# Patient Record
Sex: FEMALE | Race: WHITE | Employment: FULL TIME | URBAN - METROPOLITAN AREA
[De-identification: names, ages, dates, MRNs, and addresses within clinical notes are randomized per-mention and may not be internally consistent; named-entity substitution may affect disease eponyms.]

---

## 2018-01-24 ENCOUNTER — HOSPITAL ENCOUNTER (OUTPATIENT)
Dept: PHYSICAL THERAPY | Age: 38
Discharge: HOME | End: 2018-01-24
Payer: COMMERCIAL

## 2018-01-24 PROCEDURE — 97162 PT EVAL MOD COMPLEX 30 MIN: CPT

## 2018-01-24 PROCEDURE — 97535 SELF CARE MNGMENT TRAINING: CPT

## 2018-01-24 PROCEDURE — 97140 MANUAL THERAPY 1/> REGIONS: CPT

## 2018-01-24 NOTE — PROGRESS NOTES
[address] PHYSICAL THERAPY  [address] [place] [address] 201,[facility], [facility] - Phone: [phone]  Fax: [phone]  Request for use of Dry Needling/Intramuscular Manual Therapy  Patient Name: Mine Lagos : 1980   Treatment/Medical Diagnosis: Right hip pain [M25.551]   Referral Source: Juan Pablo Tello MD     Date of Initial Visit: 18 Attended Visits: 1 Missed Visits: 0     Based on findings from the physical therapy examination and evaluation, the evaluating therapist believes the patient, Mine Lagos would benefit from including Dry Needling as part of the plan of care. Dry needling is an effective treatment technique utilized in conjunction with other physical therapy interventions to inactivate myofascial trigger points and the pain and dysfunction they cause. It involves the use of a very fine (usually 0.3 mm/30 gauge) solid filament sterile needle (also used for acupuncture) which is inserted into the skin and directly into a myofascial trigger point. Repeated strokes or movements of the needle without completely withdrawing it help to inactive the trigger point, all of which may take approximately 30 - 60 seconds at each site. Benefits include inactivation of trigger points, decreased pain, increased muscle length, improved movement patterns, and restoration of function. Potential risks include the following: post-needling soreness, infection, bruising/bleeding, penetration of a nerve, and pneumothorax. All treating therapist have been thoroughly educated in ways to avoid the adverse reactions. Dry Needling is an advanced procedure that requires additional training including greater than 54 hours of intensive course work.  Most treatment programs will consist of one session of dry needling each week and a possible second treatment to consist of muscle re-education, flexibility, strengthening and other manual techniques to facilitate the benefits from the dry needling therapy. If you agree with this recommendation, please sign the attached prescription form and fax it to us at [phone]. If you have questions or concerns regarding dry needling or any other treatment we may be providing, please contact us at [phone]. Thank you for allowing us to assist in the care of your patient. Therapist Signature: Kathlyn Schilder, PT, OCS, SCS, CSCS Date: 1/24/2018     Time: 9:14 AM   NOTE TO PHYSICIAN:  PLEASE COMPLETE THE ORDERS BELOW AND FAX TO   [facility]: [phone]  If you are unable to process this request in 24 hours please contact our office: [phone] I have read the above request and AGREE to the recommendation of including dry needling as part of the plan of care. ? I have read the above request and DO NOT AGREE to including dry needling as part of the plan of care.    ? I have read the above report and request that my patient continue therapy with the following changes/special instructions: _________________________________________________     Physician Signature:        Date:       Time:

## 2018-01-24 NOTE — PROGRESS NOTES
[facility] PHYSICAL THERAPY  [address] [place] [address], [address],[facility], [address] [facility] [address] [facility] [address] - Phone: [phone]  Fax: [phone] / [phone] [facility]  Patient Name: Jenise Knott : 1980   Medical   Diagnosis: Right hip pain [M25.551] Treatment Diagnosis: Right hip pain [M25.551]   Onset Date: ~2yrs ago     Referral Source: Eyal Parks MD [facility] 2018   Prior Hospitalization: See medical history Provider #: [id number]   Prior Level of Function: Pain free amb   Comorbidities: none   Medications: Verified on Patient Summary List   The Plan of Care and following information is based on the information from the initial evaluation.   ===========================================================================================  Assessment / key information:  Jenise Knott is a 42 y.o.  yo female with Dx of Right hip pain [M25.551]. She reports the onset of her R hip pain during a workout ~2yrs ago. She  currently rates her pain as 8/10 at worst, 2/10 at best, primarily located at lateral aspect of her R hip. She reports that MRI confirmed small tear in her glute minimus tendon and labral tear. She complains of difficulty and increase pain with walking. Objective Findings:   Manual Muscle Testing:  R hip abd, ext and R ankle IV/EV are Reduced. Lumbar/Si Screening: R L4-5 L FRS, L on L Si innominate noted. Palpation:  Significant tenderness to palpation at R QL, glute medius, and rectus femoris noted.   Pt instructed in HEP and will f/u in clinic for PT.  ===========================================================================================  Eval Complexity: History LOW Complexity : Zero comorbidities / personal factors that will impact the outcome / POC;  Examination  MEDIUM Complexity : 3 Standardized tests and measures addressing body structure, function, activity limitation and / or participation in recreation ; Presentation MEDIUM Complexity : Evolving with changing characteristics ; Decision Making MEDIUM Complexity : FOTO score of 26-74; Overall Complexity MEDIUM  Problem List: pain affecting function, decrease ROM, decrease strength, impaired gait/ balance, decrease ADL/ functional abilitiies, decrease activity tolerance and decrease flexibility/ joint mobility   Treatment Plan may include any combination of the following: Therapeutic exercise, Therapeutic activities, Neuromuscular re-education, Physical agent/modality, Gait/balance training, Manual therapy, Patient education, Self Care training and Functional mobility training  Patient / Family readiness to learn indicated by: asking questions, trying to perform skills and interest  Persons(s) to be included in education: patient (P)  Barriers to Learning/Limitations: no  Measures taken: FOTO = 37%   Patient Goal (s): Decrease pain    Patient self reported health status: fair  Rehabilitation Potential: good   Short Term Goals: To be accomplished in  1-2  weeks:  1. Independent with HEP. 2. Decrease max pain 25-50% to assist with amb   Long Term Goals: To be accomplished in  3-4  weeks:  1. Decrease max pain 50-75% to assist with amb  2. Increase FOTO score by 10% to show functional improvment. 3.  Will rate  >/= +5 on Global Rating of Change and be prepared to DC to HEP. Frequency / Duration:   Patient to be seen  2-3  times per week for 3-4  weeks:  Patient / Caregiver education and instruction: self care and exercises    Therapist Signature: Cindy Leyva DPT, OCS, SCS, CSCS Date: 8/31/9159   Certification Period: na Time: 9:08 AM   ===========================================================================================  I certify that the above Physical Therapy Services are being furnished while the patient is under my care. I agree with the treatment plan and certify that this therapy is necessary.     Physician Signature: Date:       Time:     Please sign and return to [facility] or you may fax the signed copy to [phone]. Thank you.

## 2018-01-24 NOTE — PROGRESS NOTES
PHYSICAL THERAPY - DAILY TREATMENT NOTE    Patient Name: Sangita Young        Date: 2018  : 1980   YES Patient  Verified  Visit #:     Insurance: Payor: Tommie Schaumann / Plan: Tamera WRIGHT / Product Type: Commerical /      In time: 7:30 Out time: 8:10   Total Treatment Time: 40     Medicare Time Tracking (below)   Total Timed Codes (min):  na 1:1 Treatment Time:  na     TREATMENT AREA =  Right knee pain [M25.561]    SUBJECTIVE  Pain Level (on 0 to 10 scale):    Medication Changes/New allergies or changes in medical history, any new surgeries or procedures? NO    If yes, update Summary List   Subjective Functional Status/Changes:  []  No changes reported     SEE IE          OBJECTIVE    10 min Manual Therapy: DTM R Gm, R para, piri, L4-5 L FRS, L on L SI akua noted   Rationale:      decrease pain, increase ROM and increase tissue extensibility to improve patient's ability to perform ADLs     min Patient Education:  YES  Reviewed HEP   []  Progressed/Changed HEP based on: Other Objective/Functional Measures:    SEE IE     Post Treatment Pain Level (on 0 to 10) scale:       ASSESSMENT  Assessment/Changes in Function:     SEE IE     []  See Progress Note/Recertification   Patient will continue to benefit from skilled PT services to modify and progress therapeutic interventions, address functional mobility deficits, address ROM deficits, address strength deficits, analyze and address soft tissue restrictions, analyze and cue movement patterns, analyze and modify body mechanics/ergonomics and assess and modify postural abnormalities to attain remaining goals.    Progress toward goals / Updated goals:         PLAN  []  Upgrade activities as tolerated YES Continue plan of care   []  Discharge due to :    []  Other:      Therapist: José Luis Cheema, PT, OCS, SCS, CSCS    Date: 2018 Time: 8:30 AM       Future Appointments  Date Time Provider Basil Trivedi   2018 8:00 AM Shary Schilder, PT [facility]   2/1/2018 7:30 AM Deshaun Allen, PT [facility]   2/6/2018 5:30 PM Deshaun Allen, PT [facility]   2/8/2018 7:30 AM Deshaun Allen, PT [facility]   2/12/2018 7:00 AM Deshaun Allen, PT [facility]   2/15/2018 7:30 AM Deshaun Allen, PT [facility]   2/20/2018 4:30 PM Deshaun Allen, PT [facility]   2/22/2018 7:30 AM Deshaun Allen, PT [facility]

## 2018-01-26 ENCOUNTER — HOSPITAL ENCOUNTER (OUTPATIENT)
Dept: PHYSICAL THERAPY | Age: 38
Discharge: HOME | End: 2018-01-26
Payer: COMMERCIAL

## 2018-01-26 PROCEDURE — 97124 MASSAGE THERAPY: CPT

## 2018-01-26 PROCEDURE — 97110 THERAPEUTIC EXERCISES: CPT

## 2018-01-26 PROCEDURE — 97140 MANUAL THERAPY 1/> REGIONS: CPT

## 2018-01-26 NOTE — PROGRESS NOTES
PHYSICAL THERAPY - DAILY TREATMENT NOTE    Patient Name: Jabier Gutiérrez        Date: 2018  : 1980   YES Patient  Verified  Visit #:   2   of   12  Insurance: Payor: López Raines / Plan: Linden WRIGHT / Product Type: Commerical /      In time: 8:00 Out time: 8:35   Total Treatment Time: 35     Medicare Time Tracking (below)   Total Timed Codes (min):  na 1:1 Treatment Time:  na     TREATMENT AREA =  Right hip pain [M25.551]    SUBJECTIVE  Pain Level (on 0 to 10 scale):  7  / 10   Medication Changes/New allergies or changes in medical history, any new surgeries or procedures? NO    If yes, update Summary List   Subjective Functional Status/Changes:  []  No changes reported     My LB is killing me today          OBJECTIVE    10 min Therapeutic Exercise:  [x]  See flow sheet   Rationale:      increase ROM, increase strength and improve coordination to improve the patients ability to amb     25 min Manual Therapy: DTM Gm, Psoas, L/S para, piri, L5 L FRS, L on L SI akua   Rationale:      decrease pain, increase ROM and increase tissue extensibility to improve patient's ability to amb       min Patient Education:  YES  Reviewed HEP   []  Progressed/Changed HEP based on: Other Objective/Functional Measures:    Sig difficulty with laying on sides     Post Treatment Pain Level (on 0 to 10) scale:   7  / 10     ASSESSMENT  Assessment/Changes in Function:     Poor razia to all Rx today     []  See Progress Note/Recertification   Patient will continue to benefit from skilled PT services to modify and progress therapeutic interventions, address functional mobility deficits, address ROM deficits, address strength deficits, analyze and address soft tissue restrictions, analyze and cue movement patterns, analyze and modify body mechanics/ergonomics and assess and modify postural abnormalities to attain remaining goals. Progress toward goals / Updated goals:     Independent with HEP     PLAN  []  Upgrade activities as tolerated YES Continue plan of care   []  Discharge due to :    []  Other:      Therapist: Radha Lou, PT, OCS, SCS, CSCS    Date: 1/26/2018 Time: 6:28 AM       Future Appointments  Date Time Provider Basil Trivedi   1/26/2018 8:00 AM Jailene Gonzalez PT [facility]   2/1/2018 7:30 AM Jailene Gonzalez PT [facility]   2/6/2018 5:30 PM Jailene Gonzalez PT [facility]   2/8/2018 7:30 AM Jailene Gonzalez PT [facility]   2/12/2018 7:00 AM Jailene Gonzalez PT [facility]   2/15/2018 7:30 AM Jailene Gonzalez PT [facility]   2/20/2018 4:30 PM Jailene Gonzalez PT [facility]   2/22/2018 7:30 AM Jailene Gonzalez PT [facility]

## 2018-02-01 ENCOUNTER — HOSPITAL ENCOUNTER (OUTPATIENT)
Dept: PHYSICAL THERAPY | Age: 38
Discharge: HOME | End: 2018-02-01
Payer: COMMERCIAL

## 2018-02-01 PROCEDURE — 97110 THERAPEUTIC EXERCISES: CPT

## 2018-02-01 PROCEDURE — 97140 MANUAL THERAPY 1/> REGIONS: CPT

## 2018-02-01 NOTE — PROGRESS NOTES
PHYSICAL THERAPY - DAILY TREATMENT NOTE    Patient Name: Jenise Knott        Date: 2018  : 1980   YES Patient  Verified  Visit #:   3   of   12  Insurance: Payor: Abdulaziz Handley / Plan: Luz Marina WRIGHT / Product Type: Commerical /      In time: 7:30 Out time: 8:20   Total Treatment Time: 50     Medicare Time Tracking (below)   Total Timed Codes (min):  na 1:1 Treatment Time:  na     TREATMENT AREA =  Right hip pain [M25.551]    SUBJECTIVE  Pain Level (on 0 to 10 scale):  3  / 10   Medication Changes/New allergies or changes in medical history, any new surgeries or procedures? NO    If yes, update Summary List   Subjective Functional Status/Changes:  []  No changes reported     It just started to go back to the base line pain level. It was hurting a bit after the alst visit. OBJECTIVE  10 min Therapeutic Exercise:  [x]  See flow sheet   Rationale:      increase ROM, increase strength and improve coordination to improve the patients ability to amb      25 min Manual Therapy: L3-5 L ERS, L on L Si akua, DTM para, Psoas, RF   Rationale:      decrease pain, increase ROM and increase tissue extensibility to improve patient's ability to amb  Dry Needling Procedure Note    Dry Needle Session Number:  1    Procedure: An intramuscular manual therapy (dry needling) and a neuro-muscular re-education treatment was done to deactivate myofascial trigger points, with a 15/30 gauge solid filament needle, under aseptic technique. Indication(s): [] Muscle spasms [] Myalgia/Myositis  [] Muscle cramps      [] Muscle imbalances [] TMD (TMJ) [] Myofascial pain & dysfunction     [] Other: __    Chart reviewed for the following:  Filemon VÁSQUEZ PT, have reviewed the medical history, summary list and precautions/contraindications for Jenise Knott.     TIME OUT performed immediately prior to start of procedure:  7:40 (enter time the timeout was completed)  Filemon VÁSQUEZ PT, have performed the following reviews on Israel Rivas prior to the start of the session:      [x] Patient was identified by name and date of birth    [x] Agreement on all muscles being treated was verified   [x] Purpose of dry needling, side effects, possible complications, risks and benefits were explained to the patient   [x] Procedure site(s) verified  [x] Patient was positioned for comfort and draped for privacy  [x] Informed Consent was signed (initial visit) and verified verbally (subsequent visits)  [x] Patient was instructed on the need to report the use of blood thinners and/or immunosuppressant medications  [x] How to respond to possible adverse effects of treatment  [x] Self treatment of post needling soreness: ice, heat (moist heat, heat wraps) and stretching  [x] Opportunity was given to ask any questions, all questions were answered            Treatment:  The following muscles were treated today:    Right: Gm, Piri, deep ER   Left: Lumbar para/multifidus,      Patients response to todays treatment:   []  LTRs  []  Muscle Relaxation  []  Pain Relief  []  Decreased Tinnitus  []  Decreased HAs []  Post needling soreness []  Increased ROM   []  Other:         min Patient Education:  YES  Reviewed HEP   []  Progressed/Changed HEP based on: Other Objective/Functional Measures:    Cont to have sig TTP noted at L5     Post Treatment Pain Level (on 0 to 10) scale:   5  / 10     ASSESSMENT  Assessment/Changes in Function:     C/o sig soreness after DN     []  See Progress Note/Recertification   Patient will continue to benefit from skilled PT services to modify and progress therapeutic interventions, address functional mobility deficits, address ROM deficits, address strength deficits, analyze and address soft tissue restrictions, analyze and cue movement patterns, analyze and modify body mechanics/ergonomics and assess and modify postural abnormalities to attain remaining goals.    Progress toward goals / Updated goals:     No sig change towards LTGs      PLAN  []  Upgrade activities as tolerated YES Continue plan of care   []  Discharge due to :    []  Other:      Therapist: Brittany Chapa, PT, OCS, SCS, CSCS    Date: 2/1/2018 Time: 6:31 AM       Future Appointments  Date Time Provider Basil Trivedi   2/1/2018 7:30 AM Deshaun Allen, PT [facility]   2/6/2018 5:30 PM Deshaun Allen, PT [facility]   2/8/2018 7:30 AM Deshaun Allen PT [facility]   2/12/2018 7:00 AM Deshaun Allen, PT [facility]   2/15/2018 7:30 AM Deshaun Allen PT [facility]   2/20/2018 4:30 PM Deshaun Allen, PT [facility]   2/22/2018 7:30 AM Deshaun Allen, PT [facility]

## 2018-02-06 ENCOUNTER — HOSPITAL ENCOUNTER (OUTPATIENT)
Dept: PHYSICAL THERAPY | Age: 38
Discharge: HOME | End: 2018-02-06
Payer: COMMERCIAL

## 2018-02-06 PROCEDURE — 97110 THERAPEUTIC EXERCISES: CPT

## 2018-02-06 PROCEDURE — 97140 MANUAL THERAPY 1/> REGIONS: CPT

## 2018-02-06 NOTE — PROGRESS NOTES
PHYSICAL THERAPY - DAILY TREATMENT NOTE    Patient Name: Marielle Lopez        Date: 2018  : 1980   YES Patient  Verified  Visit #:     Insurance: Payor: Glenford Galeazzi / Plan: Zohra WRIGHT / Product Type: Commerical /      In time: 5:30 Out time: 6:05   Total Treatment Time: 35     Medicare Time Tracking (below)   Total Timed Codes (min):  na 1:1 Treatment Time:  na     TREATMENT AREA =  Right hip pain [M25.551]    SUBJECTIVE  Pain Level (on 0 to 10 scale):  3  / 10   Medication Changes/New allergies or changes in medical history, any new surgeries or procedures? NO    If yes, update Summary List   Subjective Functional Status/Changes:  []  No changes reported     I actually felt a little better a day after the DN Rx          OBJECTIVE  10 min Therapeutic Exercise:  [x]  See flow sheet   Rationale:      increase ROM, increase strength and improve coordination to improve the patients ability to amb      25 min Manual Therapy: DTM Gm, Psoas, L/S para, piri, L5 L FRS, L on L SI akua   Rationale:      decrease pain, increase ROM and increase tissue extensibility to improve patient's ability to amb     min Patient Education:  YES  Reviewed HEP   []  Progressed/Changed HEP based on: Other Objective/Functional Measures:    Cont to have sig TTP at Gm     Post Treatment Pain Level (on 0 to 10) scale:    10     ASSESSMENT  Assessment/Changes in Function:     Difficulty tolerating supine position today     []  See Progress Note/Recertification   Patient will continue to benefit from skilled PT services to modify and progress therapeutic interventions, address functional mobility deficits, address ROM deficits, address strength deficits, analyze and address soft tissue restrictions, analyze and cue movement patterns, analyze and modify body mechanics/ergonomics and assess and modify postural abnormalities to attain remaining goals.    Progress toward goals / Updated goals:    No change towards LTGs today     PLAN  []  Upgrade activities as tolerated YES Continue plan of care   []  Discharge due to :    []  Other:      Therapist: Beatriz Hatch, PT, OCS, SCS, CSCS    Date: 2/6/2018 Time: 11:25 AM       Future Appointments  Date Time Provider Basil Trivedi   2/6/2018 5:30 PM Aristeo Day PT [facility]   2/8/2018 7:30 AM Aristeo Day PT [facility]   2/12/2018 7:00 AM Aristeo Day PT [facility]   2/15/2018 7:30 AM Aristeo Day PT [facility]   2/20/2018 4:30 PM Aristeo Day PT [facility]   2/22/2018 7:30 AM Aristeo Day, PT [facility]

## 2018-02-08 ENCOUNTER — HOSPITAL ENCOUNTER (OUTPATIENT)
Dept: PHYSICAL THERAPY | Age: 38
Discharge: HOME | End: 2018-02-08
Payer: COMMERCIAL

## 2018-02-08 PROCEDURE — 97140 MANUAL THERAPY 1/> REGIONS: CPT

## 2018-02-08 PROCEDURE — 97110 THERAPEUTIC EXERCISES: CPT

## 2018-02-08 NOTE — PROGRESS NOTES
PHYSICAL THERAPY - DAILY TREATMENT NOTE    Patient Name: Jenise Knott        Date: 2018  : 1980   YES Patient  Verified  Visit #:     Insurance: Payor: Abdulaziz Handley / Plan: Luz Marina WRIGHT / Product Type: Commerical /      In time: 7:30 Out time: 8:20   Total Treatment Time: 50     Medicare Time Tracking (below)   Total Timed Codes (min):  na 1:1 Treatment Time:  na     TREATMENT AREA =  Right hip pain [M25.551]    SUBJECTIVE  Pain Level (on 0 to 10 scale):  3   10   Medication Changes/New allergies or changes in medical history, any new surgeries or procedures? NO    If yes, update Summary List   Subjective Functional Status/Changes:  []  No changes reported     Im better with walking. Im not limping as much          OBJECTIVE  20 min Therapeutic Exercise:  [x]  See flow sheet   Rationale:      increase ROM, increase strength and improve coordination to improve the patients ability to amb       30 min Manual Therapy: L5 L ERS, L on R Si akua, DTM para, piri, HS, Gm   Rationale:      decrease pain, increase ROM and increase tissue extensibility to improve patient's ability to amb       min Patient Education:  YES  Reviewed HEP   []  Progressed/Changed HEP based on: Other Objective/Functional Measures:    Cont to have sig TTP at R Gm     Post Treatment Pain Level (on 0 to 10) scale:   3  / 10     ASSESSMENT  Assessment/Changes in Function:     Good razia to all Rx without increase in pain      []  See Progress Note/Recertification   Patient will continue to benefit from skilled PT services to modify and progress therapeutic interventions, address functional mobility deficits, address ROM deficits, address strength deficits, analyze and address soft tissue restrictions, analyze and cue movement patterns, analyze and modify body mechanics/ergonomics and assess and modify postural abnormalities to attain remaining goals.    Progress toward goals / Updated goals:    Progressing slowly with pain reduction     PLAN  []  Upgrade activities as tolerated YES Continue plan of care   []  Discharge due to :    []  Other:      Therapist: Luda Garg, PT, OCS, SCS, CSCS    Date: 2/8/2018 Time: 6:30 AM       Future Appointments  Date Time Provider Basil Trivedi   2/8/2018 7:30 AM Shawna Whitley PT [facility]   2/12/2018 7:00 AM Shawna Whitley PT [facility]   2/15/2018 7:30 AM Shawna Whitley PT [facility]   2/20/2018 4:30 PM Shawna Whitley PT [facility]   2/22/2018 7:30 AM Shawna Whitley PT [facility]

## 2018-02-12 ENCOUNTER — HOSPITAL ENCOUNTER (OUTPATIENT)
Dept: PHYSICAL THERAPY | Age: 38
Discharge: HOME | End: 2018-02-12
Payer: COMMERCIAL

## 2018-02-12 PROCEDURE — 97110 THERAPEUTIC EXERCISES: CPT

## 2018-02-12 PROCEDURE — 97140 MANUAL THERAPY 1/> REGIONS: CPT

## 2018-02-12 NOTE — PROGRESS NOTES
PHYSICAL THERAPY - DAILY TREATMENT NOTE    Patient Name: Jenise Knott        Date: 2018  : 1980   YES Patient  Verified  Visit #:     Insurance: Payor: Abdulaziz Handley / Plan: Luz Marina WRIGHT / Product Type: Commerical /      In time: 7:00 Out time: 7:25   Total Treatment Time: 25     Medicare Time Tracking (below)   Total Timed Codes (min):  na 1:1 Treatment Time:  na     TREATMENT AREA =  Right hip pain [M25.551]    SUBJECTIVE  Pain Level (on 0 to 10 scale):  4  / 10   Medication Changes/New allergies or changes in medical history, any new surgeries or procedures? NO    If yes, update Summary List   Subjective Functional Status/Changes:  []  No changes reported     Still very sore          OBJECTIVE  10 min Therapeutic Exercise:  [x]  See flow sheet   Rationale:      increase ROM, increase strength and improve coordination to improve the patients ability to amb       13 min Manual Therapy: L5 FRS, L on L Si akua   Rationale:      decrease pain, increase ROM and increase tissue extensibility to improve patient's ability to amb  Dry Needling Procedure Note     Dry Needle Session Number:  2     Procedure:    An intramuscular manual therapy (dry needling) and a neuro-muscular re-education treatment was done to deactivate myofascial trigger points, with a 15/30 gauge solid filament needle, under aseptic technique.     Indication(s):                  [] Muscle spasms                [] Myalgia/Myositis            [] Muscle cramps                                                                                 [] Muscle imbalances                   [] TMD (TMJ)                    [] Myofascial pain & dysfunction                                               [] Other: __     Chart reviewed for the following:  Filemon VÁSQUEZ PT, have reviewed the medical history, summary list and precautions/contraindications for [facility] [address] performed immediately prior to start of procedure:  7:02 (enter time the timeout was completed)  Krystle VÁSQUEZ, PT, have performed the following reviews on Jenna [employer] prior to the start of the session:      [x] Patient was identified by name and date of birth    [x] Agreement on all muscles being treated was verified   [x] Purpose of dry needling, side effects, possible complications, risks and benefits were explained to the patient   [x] Procedure site(s) verified  [x] Patient was positioned for comfort and draped for privacy  [x] Informed Consent was signed (initial visit) and verified verbally (subsequent visits)  [x] Patient was instructed on the need to report the use of blood thinners and/or immunosuppressant medications  [x] How to respond to possible adverse effects of treatment  [x] Self treatment of post needling soreness: ice, heat (moist heat, heat wraps) and stretching  [x] Opportunity was given to ask any questions, all questions were answered     Treatment:  The following muscles were treated today:     Right: Gm, Piri, lumbar para/multifidus   Left: ,       Patients response to todays treatment:   []  LTRs                                   []  Muscle Relaxation                                          []  Pain Relief                                         []  Decreased Tinnitus  []  Decreased HAs                     []  Post needling soreness            []  Increased ROM                        []  Other:          min Patient Education:  YES  Reviewed HEP   []  Progressed/Changed HEP based on:         Other Objective/Functional Measures:    Cont to c/o pain with sup with B LE extended     Post Treatment Pain Level (on 0 to 10) scale:   4  / 10     ASSESSMENT  Assessment/Changes in Function:     Good razia to all Rx without increase in pain      []  See Progress Note/Recertification   Patient will continue to benefit from skilled PT services to modify and progress therapeutic interventions, address functional mobility deficits, address ROM deficits, address strength deficits, analyze and address soft tissue restrictions, analyze and cue movement patterns, analyze and modify body mechanics/ergonomics and assess and modify postural abnormalities to attain remaining goals.    Progress toward goals / Updated goals:    Slow progress with pain reduction     PLAN  []  Upgrade activities as tolerated YES Continue plan of care   []  Discharge due to :    []  Other:      Therapist: Lauren Edge, PT, OCS, SCS, CSCS    Date: 2/12/2018 Time: 6:31 AM       Future Appointments  Date Time Provider Basil Trivedi   2/12/2018 7:00 AM Devora Cerda PT [facility]   2/15/2018 7:30 AM Devora Cerda PT [facility]   2/20/2018 4:30 PM Devora Cerda PT [facility]   2/22/2018 7:30 AM Devora Cerda PT [facility]

## 2018-02-15 ENCOUNTER — HOSPITAL ENCOUNTER (OUTPATIENT)
Dept: PHYSICAL THERAPY | Age: 38
Discharge: HOME | End: 2018-02-15
Payer: COMMERCIAL

## 2018-02-15 PROCEDURE — 97140 MANUAL THERAPY 1/> REGIONS: CPT

## 2018-02-15 PROCEDURE — 97110 THERAPEUTIC EXERCISES: CPT

## 2018-02-15 NOTE — PROGRESS NOTES
PHYSICAL THERAPY - DAILY TREATMENT NOTE    Patient Name: Marsha Fiore        Date: 2/15/2018  : 1980   YES Patient  Verified  Visit #:     Insurance: Payor: Fletcher Cleary / Plan: Patel WRIGHT / Product Type: Commerical /      In time: 7:30 Out time: 8:15   Total Treatment Time: 45     Medicare Time Tracking (below)   Total Timed Codes (min):  na 1:1 Treatment Time:  na     TREATMENT AREA =  Right hip pain [M25.551]    SUBJECTIVE  Pain Level (on 0 to 10 scale):  2   10   Medication Changes/New allergies or changes in medical history, any new surgeries or procedures? NO    If yes, update Summary List   Subjective Functional Status/Changes:  []  No changes reported     Im not dragging my leg any  more          OBJECTIVE  20 min Therapeutic Exercise:  [x]  See flow sheet   Rationale:      increase ROM, increase strength and improve coordination to improve the patients ability to amb       25 min Manual Therapy: L5 FRS, L on L Si akua, DTM R para, Piri, Gm   Rationale:      decrease pain, increase ROM and increase tissue extensibility to improve patient's ability to amb     min Patient Education:  YES  Reviewed HEP   []  Progressed/Changed HEP based on: Other Objective/Functional Measures:    FOTO = 66  GROC = +5     Post Treatment Pain Level (on 0 to 10) scale:   3  / 10     ASSESSMENT  Assessment/Changes in Function:     C/o soreness after man Rx     []  See Progress Note/Recertification   Patient will continue to benefit from skilled PT services to modify and progress therapeutic interventions, address functional mobility deficits, address ROM deficits, address strength deficits, analyze and address soft tissue restrictions, analyze and cue movement patterns, analyze and modify body mechanics/ergonomics and assess and modify postural abnormalities to attain remaining goals.    Progress toward goals / Updated goals:    Progressing well with function      PLAN  []  Upgrade activities as tolerated YES Continue plan of care   []  Discharge due to :    []  Other:      Therapist: Aakash Hawkins, PT, OCS, SCS, CSCS    Date: 2/15/2018 Time: 6:31 AM       Future Appointments  Date Time Provider Basil Trivedi   2/15/2018 7:30 AM Christiano Khoury PT [facility]   2/20/2018 4:30 PM Christiano Khoury PT [facility]   2/22/2018 7:30 AM Christiano Khoury PT [facility]

## 2018-02-20 ENCOUNTER — HOSPITAL ENCOUNTER (OUTPATIENT)
Dept: PHYSICAL THERAPY | Age: 38
Discharge: HOME | End: 2018-02-20
Payer: COMMERCIAL

## 2018-02-20 PROCEDURE — 97140 MANUAL THERAPY 1/> REGIONS: CPT

## 2018-02-20 PROCEDURE — 97110 THERAPEUTIC EXERCISES: CPT

## 2018-02-20 NOTE — PROGRESS NOTES
PHYSICAL THERAPY - DAILY TREATMENT NOTE    Patient Name: Alexis Alvarenga        Date: 2018  : 1980   YES Patient  Verified  Visit #:     Insurance: Payor: Jeevan Cruz / Plan: Arland Castleman RPN / Product Type: Commerical /      In time: 4:33 Out time: 5:15   Total Treatment Time: 42     Medicare Time Tracking (below)   Total Timed Codes (min):  na 1:1 Treatment Time:  na     TREATMENT AREA =  Right hip pain [M25.551]    SUBJECTIVE  Pain Level (on 0 to 10 scale):   10   Medication Changes/New allergies or changes in medical history, any new surgeries or procedures? NO    If yes, update Summary List   Subjective Functional Status/Changes:  []  No changes reported     My LB is killing me today. But my hip has been feeling better overall . I no longer have the constant pain. 5-6/10 at the worst.           OBJECTIVE  20 min Therapeutic Exercise:  [x]  See flow sheet   Rationale:      increase ROM, increase strength and improve coordination to improve the patients ability to amb       15 min Manual Therapy: L3-5 ERS, L on L Si akua   Rationale:      decrease pain, increase ROM and increase tissue extensibility to improve patient's ability to amb  Dry Needling Procedure Note      Dry Needle Session Number:  3      Procedure:    An intramuscular manual therapy (dry needling) and a neuro-muscular re-education treatment was done to deactivate myofascial trigger points, with a 15/30 gauge solid filament needle, under aseptic technique.      Indication(s):                  [] Muscle spasms                [] Myalgia/Myositis            [] Muscle cramps                                                                                 [] Muscle imbalances                   [] TMD (TMJ)                    [] Myofascial pain & dysfunction                                               [] Other: __      Chart reviewed for the following:  Edwige VÁSQUEZ, PT, have reviewed the medical history, summary list and precautions/contraindications for [facility] performed immediately prior to start of procedure:  4:40 (enter time the timeout was completed)  IEdwige PT, have performed the following reviews on Kathryn Quijano prior to the start of the session:      [x] Patient was identified by name and date of birth    [x] Agreement on all muscles being treated was verified   [x] Purpose of dry needling, side effects, possible complications, risks and benefits were explained to the patient   [x] Procedure site(s) verified  [x] Patient was positioned for comfort and draped for privacy  [x] Informed Consent was signed (initial visit) and verified verbally (subsequent visits)  [x] Patient was instructed on the need to report the use of blood thinners and/or immunosuppressant medications  [x] How to respond to possible adverse effects of treatment  [x] Self treatment of post needling soreness: ice, heat (moist heat, heat wraps) and stretching  [x] Opportunity was given to ask any questions, all questions were answered      Treatment:  The following muscles were treated today:      Right: Gm, Piri, lumbar para/multifidus   Left: , lumbar para/multifidus       Patients response to todays treatment:   []  LTRs                                   []  Muscle Relaxation                                          []  Pain Relief                                         []  Decreased Tinnitus  []  Decreased HAs                     []  Post needling soreness            []  Increased MIRELA                        []  Other:            min Patient Education:  YES  Reviewed HEP   []  Progressed/Changed HEP based on:         Other Objective/Functional Measures:    Cont to have sig TTP noted at Gm/piri     Post Treatment Pain Level (on 0 to 10) scale:   3  / 10     ASSESSMENT  Assessment/Changes in Function:     Good razia to all Rx without increase in pain      []  See Progress Note/Recertification   Patient will continue to benefit from skilled PT services to modify and progress therapeutic interventions, address functional mobility deficits, address ROM deficits, address strength deficits, analyze and address soft tissue restrictions, analyze and cue movement patterns, analyze and modify body mechanics/ergonomics and assess and modify postural abnormalities to attain remaining goals.    Progress toward goals / Updated goals:    Slowly progressing with pain reduction      PLAN  []  Upgrade activities as tolerated YES Continue plan of care   []  Discharge due to :    []  Other:      Therapist: Aakash Hawkins, PT, OCS, SCS, CSCS    Date: 2/20/2018 Time: 2:49 PM       Future Appointments  Date Time Provider Baisl Trivedi   2/20/2018 4:30 PM [facility]   2/22/2018 7:30 AM Christiano Khoury PT [facility]

## 2018-02-20 NOTE — PROGRESS NOTES
[address] PHYSICAL THERAPY  [address] [place] [address], [place] [address],[facility], [facility] - Phone: [phone]  Fax: [phone]  PROGRESS NOTE  Patient Name: Mine Lagos : 1980   Treatment/Medical Diagnosis: Right hip pain [M25.551]   Referral Source: Juan Pablo Tello MD     Date of Initial Visit: 18 Attended Visits: 8 Missed Visits: 0     SUMMARY OF TREATMENT  Mine Lagos has been seen at our clinic 2-3x/wk for a total of 8 visits. Pt treatment has consisted of  therapeutic exercise for hip/core strengthening, and manual therapy(jt mobilization and deep tissue mobilization/dry needling)  CURRENT STATUS  Pt has had a good tolerance to physical therapy treatment. She no longer reports constant pain at her R hip and demonstrate improved ability to ambulate. However, she continues to presents with lumbar/SI innominate and c/o tenderness to palpation at R piriformis and erector spinae. Dang Nguyen of Progress Goal Met? 1. Decrease Max pain by 50-75% to assist with ADLs   Status at last Eval: 8/10 Current Status: 5-6/10 progressing   2. Increase FOTO score by 10 % show functional improvement   Status at last Eval: 37% Current Status: 66% yes   3. Will rate >/= +5 on Global Rating of Change and be prepared to DC to HEP. Status at last Eval: na Current Status: +5 yes     New Goals to be achieved in __4__  weeks:  1. Continue with goal #1 above   2.     3.     RECOMMENDATIONS    Specifics: 2-3x/wk x 4 more wks  If you have any questions/comments please contact us directly at [phone]. Thank you for allowing us to assist in the care of your patient.     Therapist Signature: Krista Enriquez, DPT, OCS, SCS, CSCS Date: 2018     Time: 6:30 PM   NOTE TO PHYSICIAN:  PLEASE COMPLETE THE ORDERS BELOW AND FAX TO   [facility]: [phone]  If you are unable to process this request in 24 hours please contact our office: [phone]    ___ I have read the above report and request that my patient continue as recommended.   ___ I have read the above report and request that my patient continue therapy with the following changes/special instructions:_________________________________________________________   ___ I have read the above report and request that my patient be discharged from therapy.      Physician Signature:        Date:       Time:

## 2018-02-22 ENCOUNTER — HOSPITAL ENCOUNTER (OUTPATIENT)
Dept: PHYSICAL THERAPY | Age: 38
End: 2018-02-22
Payer: COMMERCIAL

## 2018-02-28 ENCOUNTER — HOSPITAL ENCOUNTER (OUTPATIENT)
Dept: PHYSICAL THERAPY | Age: 38
Discharge: HOME | End: 2018-02-28
Payer: COMMERCIAL

## 2018-02-28 PROCEDURE — 97110 THERAPEUTIC EXERCISES: CPT

## 2018-02-28 PROCEDURE — 97140 MANUAL THERAPY 1/> REGIONS: CPT

## 2018-02-28 NOTE — PROGRESS NOTES
PHYSICAL THERAPY - DAILY TREATMENT NOTE    Patient Name: Alex Coppola        Date: 2018  : 1980   YES Patient  Verified  Visit #:     Insurance: Payor: Oswald Mailbox / Plan: Jeremie WRIGHT / Product Type: Commerical /      In time: 8:05 Out time: 9:03   Total Treatment Time: 58     Medicare Time Tracking (below)   Total Timed Codes (min):  na 1:1 Treatment Time:  na     TREATMENT AREA =  Right hip pain [M25.551]    SUBJECTIVE  Pain Level (on 0 to 10 scale):  4   10   Medication Changes/New allergies or changes in medical history, any new surgeries or procedures? NO    If yes, update Summary List   Subjective Functional Status/Changes:  []  No changes reported     I noticed that I can sit much longer. It still hurts but not the same           OBJECTIVE  25 min Therapeutic Exercise:  [x]  See flow sheet   Rationale:      increase ROM, increase strength and improve coordination to improve the patients ability to amb       33 min Manual Therapy: DTM R para, piri, Gm, L4-5 FRS, L on L Si akua   Rationale:      decrease pain, increase ROM and increase tissue extensibility to improve patient's ability to amb       min Patient Education:  YES  Reviewed HEP   []  Progressed/Changed HEP based on: Other Objective/Functional Measures:    Cont to have difficulty with sidelying     Post Treatment Pain Level (on 0 to 10) scale:   4   10     ASSESSMENT  Assessment/Changes in Function:     Good razia to all Rx without increase in pain      []  See Progress Note/Recertification   Patient will continue to benefit from skilled PT services to modify and progress therapeutic interventions, address functional mobility deficits, address ROM deficits, address strength deficits, analyze and address soft tissue restrictions, analyze and cue movement patterns, analyze and modify body mechanics/ergonomics and assess and modify postural abnormalities to attain remaining goals.    Progress toward goals / Updated goals:     Slowly progressing with function      PLAN  []  Upgrade activities as tolerated YES Continue plan of care   []  Discharge due to :    []  Other:      Therapist: Joshua Person, PT, OCS, SCS, CSCS    Date: 2/28/2018 Time: 6:28 AM       Future Appointments  Date Time Provider Basil Trivedi   2/28/2018 8:00 AM Mary Soto PT [facility]   3/2/2018 2:00 PM Mary Soto PT [facility]   3/6/2018 12:00 PM Mary Soto PT [address] [place] [address]   3/8/2018 8:00 AM Mary Soto PT [facility]   3/12/2018 8:00 AM Mary Soto PT [facility]   3/15/2018 7:00 AM Mary Soto, PT [facility]   3/20/2018 4:00 PM Mary Soto PT [facility]   3/22/2018 7:00 AM Mary Soto PT [facility]   3/27/2018 4:00 PM Mary Soto, PT [facility]

## 2018-03-02 ENCOUNTER — HOSPITAL ENCOUNTER (OUTPATIENT)
Dept: PHYSICAL THERAPY | Age: 38
Discharge: HOME | End: 2018-03-02
Payer: COMMERCIAL

## 2018-03-02 PROCEDURE — 97140 MANUAL THERAPY 1/> REGIONS: CPT

## 2018-03-02 PROCEDURE — 97110 THERAPEUTIC EXERCISES: CPT

## 2018-03-02 NOTE — PROGRESS NOTES
PHYSICAL THERAPY - DAILY TREATMENT NOTE    Patient Name: Duane Young        Date: 3/2/2018  : 1980   YES Patient  Verified  Visit #:   10   of   12  Insurance: Payor: Peter Jimenez / Plan: Michael WRIGHT / Product Type: Commerical /      In time: 9:52 Out time: 10:30   Total Treatment Time: 38     Medicare Time Tracking (below)   Total Timed Codes (min):  na 1:1 Treatment Time:  na     TREATMENT AREA =  Right hip pain [M25.551]    SUBJECTIVE  Pain Level (on 0 to 10 scale):  4  / 10   Medication Changes/New allergies or changes in medical history, any new surgeries or procedures? NO    If yes, update Summary List   Subjective Functional Status/Changes:  []  No changes reported     I woke up with bad pain this AM.  I think its the way Im slwwping. OBJECTIVE  20 min Therapeutic Exercise:  [x]  See flow sheet   Rationale:      increase ROM, increase strength and improve coordination to improve the patients ability to amb       15 min Manual Therapy: DTM Deep ER, L on L Si akua   Rationale:      decrease pain, increase ROM and increase tissue extensibility to improve patient's ability to amb  Dry Needling Procedure Note      Dry Needle Session Number:  3      Procedure:    An intramuscular manual therapy (dry needling) and a neuro-muscular re-education treatment was done to deactivate myofascial trigger points, with a 15/30 gauge solid filament needle, under aseptic technique.      Indication(s):                  [] Muscle spasms                [] Myalgia/Myositis            [] Muscle cramps                                                                                 [] Muscle imbalances                   [] TMD (TMJ)                    [] Myofascial pain & dysfunction                                               [] Other: __      Chart reviewed for the following:  I, Cherly Nageotte, PT, have reviewed the medical history, summary list and precautions/contraindications for [facility] performed immediately prior to start of procedure:  9:55 (enter time the timeout was completed)  IKaneo, PT, have performed the following reviews on Kathryn Quijano prior to the start of the session:      [x] Patient was identified by name and date of birth    [x] Agreement on all muscles being treated was verified   [x] Purpose of dry needling, side effects, possible complications, risks and benefits were explained to the patient   [x] Procedure site(s) verified  [x] Patient was positioned for comfort and draped for privacy  [x] Informed Consent was signed (initial visit) and verified verbally (subsequent visits)  [x] Patient was instructed on the need to report the use of blood thinners and/or immunosuppressant medications  [x] How to respond to possible adverse effects of treatment  [x] Self treatment of post needling soreness: ice, heat (moist heat, heat wraps) and stretching  [x] Opportunity was given to ask any questions, all questions were answered      Treatment:  The following muscles were treated today:      Right: Gm, Piri, lumbar para/multifidus   Left: , lumbar para/multifidus       Patients response to todays treatment:   []  LTRs                                   []  Muscle Relaxation                                          []  Pain Relief                                         []  Decreased Tinnitus  []  Decreased HAs                     []       min Patient Education:  YES  Reviewed HEP   []  Progressed/Changed HEP based on:         Other Objective/Functional Measures:    Sig TTP noted at med aspect of Deep ER     Post Treatment Pain Level (on 0 to 10) scale:   3  / 10     ASSESSMENT  Assessment/Changes in Function:     Good razia to all Rx without increase in pain      []  See Progress Note/Recertification   Patient will continue to benefit from skilled PT services to modify and progress therapeutic interventions, address functional mobility deficits, address ROM deficits, address strength deficits, analyze and address soft tissue restrictions, analyze and cue movement patterns, analyze and modify body mechanics/ergonomics and assess and modify postural abnormalities to attain remaining goals.    Progress toward goals / Updated goals:    Slowly progressing with pain reduction      PLAN  []  Upgrade activities as tolerated YES Continue plan of care   []  Discharge due to :    []  Other:      Therapist: Joshua Person, PT, OCS, SCS, CSCS    Date: 3/2/2018 Time: 6:29 AM       Future Appointments  Date Time Provider Basil Trivedi   3/2/2018 10:00 AM Mary Soto, PT [facility]   3/6/2018 12:00 PM Mary Soto, PT [facility]   3/8/2018 8:00 AM Mary Soto, PT [facility]   3/12/2018 8:00 AM Mary Soto, PT [facility]   3/15/2018 7:00 AM Mary Soto, PT [facility]   3/20/2018 4:00 PM Mary Soto, PT [facility]   3/22/2018 7:00 AM Mary Soto, PT [facility]   3/27/2018 4:00 PM Mary Soto, PT [facility]

## 2018-03-06 ENCOUNTER — APPOINTMENT (OUTPATIENT)
Dept: PHYSICAL THERAPY | Age: 38
End: 2018-03-06
Payer: COMMERCIAL

## 2018-03-07 ENCOUNTER — HOSPITAL ENCOUNTER (OUTPATIENT)
Dept: PHYSICAL THERAPY | Age: 38
Discharge: HOME | End: 2018-03-07
Payer: COMMERCIAL

## 2018-03-07 PROCEDURE — 97110 THERAPEUTIC EXERCISES: CPT

## 2018-03-07 PROCEDURE — 97140 MANUAL THERAPY 1/> REGIONS: CPT

## 2018-03-07 NOTE — PROGRESS NOTES
PHYSICAL THERAPY - DAILY TREATMENT NOTE    Patient Name: Ellie Harris        Date: 3/7/2018  : 1980   YES Patient  Verified  Visit #:     Insurance: Payor: Bob Montemayor / Plan: Khadijah Brenner RPN / Product Type: Commerical /      In time: 6:55 Out time: 7:40   Total Treatment Time: 45     Medicare Time Tracking (below)   Total Timed Codes (min):  na 1:1 Treatment Time:  na     TREATMENT AREA =  Right hip pain [M25.551]    SUBJECTIVE  Pain Level (on 0 to 10 scale):   10   Medication Changes/New allergies or changes in medical history, any new surgeries or procedures? NO    If yes, update Summary List   Subjective Functional Status/Changes:  []  No changes reported     I did a lot since Im moving, but I was not in severe pain           OBJECTIVE  25 min Therapeutic Exercise:  [x]  See flow sheet   Rationale:      increase ROM, increase strength and improve coordination to improve the patients ability to amb       20 min Manual Therapy: DTM R para, piri, Gm, L5 FRS, L on L Si akua   Rationale:      decrease pain, increase ROM and increase tissue extensibility to improve patient's ability to amb        min Patient Education:  YES  Reviewed HEP   []  Progressed/Changed HEP based on: Other Objective/Functional Measures:    Cont to have sig TTP at R piri     Post Treatment Pain Level (on 0 to 10) scale:   3  / 10     ASSESSMENT  Assessment/Changes in Function:     Good razia to all Rx without increase in pain      []  See Progress Note/Recertification   Patient will continue to benefit from skilled PT services to modify and progress therapeutic interventions, address functional mobility deficits, address ROM deficits, address strength deficits, analyze and address soft tissue restrictions, analyze and cue movement patterns, analyze and modify body mechanics/ergonomics and assess and modify postural abnormalities to attain remaining goals.    Progress toward goals / Updated goals:    Progressing slowly with pain reduction     PLAN  []  Upgrade activities as tolerated YES Continue plan of care   []  Discharge due to :    []  Other:      Therapist: Kassandra Alves, PT, OCS, SCS, CSCS    Date: 3/7/2018 Time: 6:29 AM       Future Appointments  Date Time Provider Basil Trivedi   3/7/2018 7:00 AM Tahir Burks, PT [facility]   3/8/2018 8:00 AM Tahir Burks, PT [facility]   3/12/2018 8:00 AM Tahir Burks, PT [facility]   3/15/2018 7:00 AM Tahir Burks, PT [facility]   3/20/2018 4:00 PM Tahir Burks, PT [facility]   3/22/2018 7:00 AM Tahir Burks, PT [facility]   3/27/2018 4:00 PM Tahir Burks, PT [facility]

## 2018-03-08 ENCOUNTER — HOSPITAL ENCOUNTER (OUTPATIENT)
Dept: PHYSICAL THERAPY | Age: 38
Discharge: HOME | End: 2018-03-08
Payer: COMMERCIAL

## 2018-03-08 PROCEDURE — 97110 THERAPEUTIC EXERCISES: CPT

## 2018-03-08 PROCEDURE — 97140 MANUAL THERAPY 1/> REGIONS: CPT

## 2018-03-08 NOTE — PROGRESS NOTES
PHYSICAL THERAPY - DAILY TREATMENT NOTE    Patient Name: Alexis Alvarenga        Date: 3/8/2018  : 1980   YES Patient  Verified  Visit #:     Insurance: Payor: Jeevan Cruz / Plan: Arland Castleman RPN / Product Type: Commerical /      In time: 8:00 Out time: 8:35   Total Treatment Time: 35     Medicare Time Tracking (below)   Total Timed Codes (min):  na 1:1 Treatment Time:  na     TREATMENT AREA =  Right hip pain [M25.551]    SUBJECTIVE  Pain Level (on 0 to 10 scale):  3  / 10   Medication Changes/New allergies or changes in medical history, any new surgeries or procedures? NO    If yes, update Summary List   Subjective Functional Status/Changes:  []  No changes reported     Its a little less pain than last time. OBJECTIVE  20 min Therapeutic Exercise:  [x]  See flow sheet   Rationale:      increase ROM, increase strength and improve coordination to improve the patients ability to amb       13 min Manual Therapy: DTM Deep ER, L on L Si akua   Rationale:      decrease pain, increase ROM and increase tissue extensibility to improve patient's ability to amb  Dry Needling Procedure Note      Dry Needle Session Number:  5      Procedure:    An intramuscular manual therapy (dry needling) and a neuro-muscular re-education treatment was done to deactivate myofascial trigger points, with a 15/30 gauge solid filament needle, under aseptic technique.      Indication(s):                  [] Muscle spasms                [] Myalgia/Myositis            [] Muscle cramps                                                                                 [] Muscle imbalances                   [] TMD (TMJ)                    [] Myofascial pain & dysfunction                                               [] Other: __      Chart reviewed for the following:  Edwige VÁSQUEZ PT, have reviewed the medical history, summary list and precautions/contraindications for [address] performed immediately prior to start of procedure:  8:02 (enter time the timeout was completed)  Lilian VÁSQUEZ, PT, have performed the following reviews on Kathryn Quijano prior to the start of the session:      [x] Patient was identified by name and date of birth    [x] Agreement on all muscles being treated was verified   [x] Purpose of dry needling, side effects, possible complications, risks and benefits were explained to the patient   [x] Procedure site(s) verified  [x] Patient was positioned for comfort and draped for privacy  [x] Informed Consent was signed (initial visit) and verified verbally (subsequent visits)  [x] Patient was instructed on the need to report the use of blood thinners and/or immunosuppressant medications  [x] How to respond to possible adverse effects of treatment  [x] Self treatment of post needling soreness: ice, heat (moist heat, heat wraps) and stretching  [x] Opportunity was given to ask any questions, all questions were answered      Treatment:  The following muscles were treated today:      Right: Gm, Piri,    Left: , lumbar para/multifidus       Patients response to todays treatment:   []  LTRs                                   []  Muscle Relaxation                                          []  Pain Relief                                         []  Decreased Tinnitus  []  Decreased HAs                     []          min Patient Education:  YES  Reviewed HEP   []  Progressed/Changed HEP based on: Other Objective/Functional Measures:  Decreased pain with bridge noted.      Post Treatment Pain Level (on 0 to 10) scale:   3  / 10     ASSESSMENT  Assessment/Changes in Function:     Good razia to all Rx without increase in pain      []  See Progress Note/Recertification   Patient will continue to benefit from skilled PT services to modify and progress therapeutic interventions, address functional mobility deficits, address ROM deficits, address strength deficits, analyze and address soft tissue restrictions, analyze and cue movement patterns, analyze and modify body mechanics/ergonomics and assess and modify postural abnormalities to attain remaining goals.    Progress toward goals / Updated goals:    Slowly progressing with pain reduction      PLAN  []  Upgrade activities as tolerated YES Continue plan of care   []  Discharge due to :    []  Other:      Therapist: Harini Yeh, PT, OCS, SCS, CSCS    Date: 3/8/2018 Time: 6:28 AM       Future Appointments  Date Time Provider Basil Trivedi   3/8/2018 8:00 AM Kane Medico, PT [facility]   3/12/2018 8:00 AM Kane Medico, PT [facility]   3/15/2018 7:00 AM Kane Medico, PT [facility]   3/20/2018 4:00 PM Kane Medico, PT [facility]   3/22/2018 7:00 AM Kane Medico, PT [facility]   3/27/2018 4:00 PM Kane Medico, PT [facility]

## 2018-03-12 ENCOUNTER — HOSPITAL ENCOUNTER (OUTPATIENT)
Dept: PHYSICAL THERAPY | Age: 38
Discharge: HOME | End: 2018-03-12
Payer: COMMERCIAL

## 2018-03-12 PROCEDURE — 97110 THERAPEUTIC EXERCISES: CPT

## 2018-03-12 PROCEDURE — 97140 MANUAL THERAPY 1/> REGIONS: CPT

## 2018-03-12 NOTE — PROGRESS NOTES
PHYSICAL THERAPY - DAILY TREATMENT NOTE    Patient Name: Jenna Live        Date: 3/12/2018  : 1980   YES Patient  Verified  Visit #:   15   of   24  Insurance: Payor: Jonathan Connolly / Plan: Gerard WRIGHT / Product Type: Commerical /      In time: 8:06 Out time: 8:50   Total Treatment Time: 44     Medicare Time Tracking (below)   Total Timed Codes (min):  na 1:1 Treatment Time:  na     TREATMENT AREA =  Right hip pain [M25.551]    SUBJECTIVE  Pain Level (on 0 to 10 scale):  4   10   Medication Changes/New allergies or changes in medical history, any new surgeries or procedures? NO    If yes, update Summary List   Subjective Functional Status/Changes:  []  No changes reported     I was in a car a lot this weekend so my hip was killing me           OBJECTIVE  19 min Therapeutic Exercise:  [x]  See flow sheet   Rationale:      increase ROM, increase strength and improve coordination to improve the patients ability to amb       25 min Manual Therapy: DTM Deep ER, piri, Gm  L5 L FRS, L on L Si akua   Rationale:      decrease pain, increase ROM and increase tissue extensibility to improve patient's ability to amb     min Patient Education:  YES  Reviewed HEP   []  Progressed/Changed HEP based on: Other Objective/Functional Measures:    Cont to have sig TTp at Gm     Post Treatment Pain Level (on 0 to 10) scale:   3   10     ASSESSMENT  Assessment/Changes in Function:     Good razia to all Rx without increase in pain      []  See Progress Note/Recertification   Patient will continue to benefit from skilled PT services to modify and progress therapeutic interventions, address functional mobility deficits, address ROM deficits, address strength deficits, analyze and address soft tissue restrictions, analyze and cue movement patterns, analyze and modify body mechanics/ergonomics and assess and modify postural abnormalities to attain remaining goals.    Progress toward goals / Updated goals:    Progressing slowly with pain reduction      PLAN  []  Upgrade activities as tolerated YES Continue plan of care   []  Discharge due to :    []  Other:      Therapist: Lauren Edge, PT, OCS, SCS, CSCS    Date: 3/12/2018 Time: 6:30 AM       Future Appointments  Date Time Provider Basil Trivedi   3/12/2018 8:00 AM Devora Cerda PT [facility]   3/15/2018 7:00 AM Devora Cerda PT [facility]   3/20/2018 4:00 PM Devora Cerda PT [facility]   3/22/2018 7:00 AM Devora Cerda PT [facility]   3/27/2018 4:00 PM Devora Cerda PT [facility]

## 2018-03-15 ENCOUNTER — HOSPITAL ENCOUNTER (OUTPATIENT)
Dept: PHYSICAL THERAPY | Age: 38
End: 2018-03-15
Payer: COMMERCIAL

## 2018-03-20 ENCOUNTER — HOSPITAL ENCOUNTER (OUTPATIENT)
Dept: PHYSICAL THERAPY | Age: 38
End: 2018-03-20
Payer: COMMERCIAL

## 2018-03-22 ENCOUNTER — APPOINTMENT (OUTPATIENT)
Dept: PHYSICAL THERAPY | Age: 38
End: 2018-03-22
Payer: COMMERCIAL

## 2018-03-27 ENCOUNTER — APPOINTMENT (OUTPATIENT)
Dept: PHYSICAL THERAPY | Age: 38
End: 2018-03-27
Payer: COMMERCIAL

## 2018-04-13 NOTE — PROGRESS NOTES
[address] PHYSICAL THERAPY  [address] [place] [address] [address], [place] [address],[facility], [facility] - Phone: [phone]  Fax: [phone]  DISCHARGE SUMMARY  Patient Name: Jean Carlos Bowers : 1980   Treatment/Medical Diagnosis: Right hip pain [M25.551]   Referral Source: Mark Tryo MD       Date of Initial Visit: 18 Attended Visits: 13 Missed Visits: 0      SUMMARY OF TREATMENT  Jean Carlos Bowers has been seen at our clinic 2-3x/wk for a total of 13 visits. Pt treatment has consisted of  therapeutic exercise for hip/core strengthening, and manual therapy(jt mobilization and deep tissue mobilization/dry needling)  CURRENT STATUS  Pt has had a good tolerance to physical therapy treatment. She no longer reports constant pain at her R hip and is able to tolerate increased level of activities. However, she did not return to PT treatment 13th visit.         Goal/Measure of Progress Goal Met? 1. Decrease Max pain by 50-75% to assist with ADLs   Status at last Eval: 8/10 Current Status: 5-6/10 progressing   2. Increase FOTO score by 10 % show functional improvement   Status at last Eval: 37% Current Status: 66% yes   3. Will rate >/= +5 on Global Rating of Change and be prepared to DC to HEP. Status at last Eval: na Current Status: +5 yes        RECOMMENDATIONS  Discharge from physical therapy treatment with HEP. Specifics:   If you have any questions/comments please contact us directly at [phone]. Thank you for allowing us to assist in the care of your patient.     Therapist Signature: Catherine Vega DPT, OCS, SCS, CSCS Date: 2018     Time: 8:04 AM

## 2019-01-15 ENCOUNTER — HOSPITAL ENCOUNTER (OUTPATIENT)
Dept: PHYSICAL THERAPY | Age: 39
Discharge: HOME | End: 2019-01-15
Payer: COMMERCIAL

## 2019-01-15 PROCEDURE — 97140 MANUAL THERAPY 1/> REGIONS: CPT

## 2019-01-15 PROCEDURE — 97535 SELF CARE MNGMENT TRAINING: CPT

## 2019-01-15 PROCEDURE — 97162 PT EVAL MOD COMPLEX 30 MIN: CPT

## 2019-01-15 NOTE — PROGRESS NOTES
PHYSICAL THERAPY - DAILY TREATMENT NOTE Patient Name: Mary Davis        Date: 1/15/2019 : 1980   YES Patient  Verified Visit #:      12  Insurance: Payor: SENTHIL / Plan: Boyd WRIGHT / Product Type: Commerical / In time: 3:05 Out time: 3:45 Total Treatment Time: 40 Medicare Time Tracking (below) Total Timed Codes (min):  na 1:1 Treatment Time:  na  
TREATMENT AREA =  Bilateral knee pain [M25.561, M25.562] SUBJECTIVE Pain Level (on 0 to 10 scale):   
Medication Changes/New allergies or changes in medical history, any new surgeries or procedures? NO    If yes, update Summary List  
Subjective Functional Status/Changes:  []  No changes reported SEE IE  
 
  
 
OBJECTIVE 10 min Manual Therapy: DTM B Quad, L IS ant inn akua, L4-5 L FRS akua, L mid foot mob Rationale:      decrease pain, increase ROM and increase tissue extensibility to improve patient's ability to perform ADLs Billed With/As: 
 [] TE 
 [] TA 
 [] Neuro [x] Self Care Patient Education: [x] Review HEP [] Progressed/Changed HEP based on:  
[] positioning   [] body mechanics   [] transfers   [] heat/ice application   
[] other:   
 min Patient Education:  YES  Reviewed HEP []  Progressed/Changed HEP based on: Other Objective/Functional Measures: SEE IE 
  
Post Treatment Pain Level (on 0 to 10) scale:    
ASSESSMENT Assessment/Changes in Function: SEE IE  
[]  See Progress Note/Recertification Patient will continue to benefit from skilled PT services to modify and progress therapeutic interventions, address functional mobility deficits, address ROM deficits, address strength deficits, analyze and address soft tissue restrictions, analyze and cue movement patterns, analyze and modify body mechanics/ergonomics and assess and modify postural abnormalities to attain remaining goals. Progress toward goals / Updated goals: PLAN 
 []  Upgrade activities as tolerated YES Continue plan of care  
[]  Discharge due to :   
[]  Other:   
 
Therapist: Shlomo Dugan, PT, OCS, SCS, CSCS Date: 1/15/2019 Time: 6:51 PM  
 
 
Future Appointments Date Time Provider Basil Trivedi 1/29/2019  1:00 PM Derek Hence, PT [facility]  
1/31/2019  7:30 AM Derek Hence, PT [facility]  
2/5/2019  4:30 PM Derek Hence, PT [facility]  
2/7/2019  7:30 AM Derek Hence, PT [facility]  
2/12/2019  4:30 PM Derek Hence, PT [facility]  
2/14/2019  7:30 AM Derek Hence, PT [facility]  
2/19/2019  4:30 PM Derek Hence, PT [facility]  
2/21/2019  8:00 AM Derek Hence, PT [facility]  
2/26/2019  4:30 PM Derek Hence, PT [facility]  
2/28/2019  7:30 AM Derek Hence, PT [facility]  
3/28/2019  7:30 AM Derek Hence, PT [facility]

## 2019-01-15 NOTE — PROGRESS NOTES
[facility] PHYSICAL THERAPY 
[address] [place] [address] [address],Karlie Mcmahon, 70 [facility] - Phone: [phone]  Fax: [phone] PLAN OF CARE / STATEMENT OF MEDICAL NECESSITY FOR PHYSICAL THERAPY SERVICES Patient Name: Durga Medina : 1980 Medical  
Diagnosis: Bilateral knee pain [M25.561, M25.562] Treatment Diagnosis: Bilateral knee pain [M25.561, M25.562] Onset Date:  Referral Source: Marie Nugent MD [facility] of [facility]): 1/15/2019 Prior Hospitalization: See medical history Provider #: [id number] Prior Level of Function: Pain free amb Comorbidities: none Medications: Verified on Patient Summary List  
The Plan of Care and following information is based on the information from the initial evaluation.  
=========================================================================================== Assessment / key information:  Durga Medina is a 34 y.o.  yo female with Dx of Bilateral knee pain [M25.561, M25.562]. She reports the onset of B knee pain after a ling bike ride in . She currently rates her pain as 9/10 at worst, 7/10 at best, primarily located at anterior aspect of her B knees. She complains of difficulty and increase pain with stair ambulation. Objective Findings:  Gait: antalgic gait noted, Knee AROM: WNL. Manual Muscle Testing:  R hip abd and ext are Reduced. Lumbar/SI Screening:  L iliosacral ant innominate, L4-5 L FRS, L on L SI innominate noted. Foot/Ankle assessment: decreased L midfoot mobility noted. Pt instructed in HEP and will f/u in clinic for PT.=========================================================================================== Eval Complexity: History MEDIUM  Complexity : 1-2 comorbidities / personal factors will impact the outcome/ POC ;  Examination  MEDIUM Complexity : 3 Standardized tests and measures addressing body structure, function, activity limitation and / or participation in recreation ; Presentation MEDIUM Complexity : Evolving with changing characteristics ; Decision Making MEDIUM Complexity : FOTO score of 26-74; Overall Complexity MEDIUM Problem List: pain affecting function, decrease ROM and decrease strength Treatment Plan may include any combination of the following: Therapeutic exercise, Therapeutic activities, Neuromuscular re-education, Physical agent/modality, Gait/balance training, Manual therapy, Patient education, Self Care training, Functional mobility training and Home safety training Patient / Family readiness to learn indicated by: asking questions Persons(s) to be included in education: patient (P) Barriers to Learning/Limitations: None Measures taken, if barriers to learning:   
Patient Goal (s): Decrease pain Patient self reported health status: fair Rehabilitation Potential: fair ? Short Term Goals: To be accomplished in  1-2  weeks: 
1. Independent with HEP. 2. Decrease max pain 25-50% to assist with ADLs ? Long Term Goals: To be accomplished in  3-4  weeks: 1. Decrease max pain 50-75% to assist with ADLs 2. Increase FOTO score to 55% to show functional improvment. 3.  Will rate  >/= +5 on Global Rating of Change and be prepared to DC to HEP. Frequency / Duration:   Patient to be seen  2-3  times per week for 3-4  weeks: 
Patient / Caregiver education and instruction: self care and exercises Therapist Signature: Leonard Connolly DPT, OCS, SCS, CSCS Date: 1/15/2019 Certification Period: na Time: 6:53 PM  
=========================================================================================== I certify that the above Physical Therapy Services are being furnished while the patient is under my care. I agree with the treatment plan and certify that this therapy is necessary.  
 
Physician Signature:       Date:      Time:  Please sign and return to [facility] or you may fax the signed copy to [phone]. Thank you.

## 2019-01-29 ENCOUNTER — HOSPITAL ENCOUNTER (OUTPATIENT)
Dept: PHYSICAL THERAPY | Age: 39
Discharge: HOME | End: 2019-01-29
Payer: COMMERCIAL

## 2019-01-29 PROCEDURE — 97110 THERAPEUTIC EXERCISES: CPT

## 2019-01-29 PROCEDURE — 97140 MANUAL THERAPY 1/> REGIONS: CPT

## 2019-01-29 NOTE — PROGRESS NOTES
PHYSICAL THERAPY - DAILY TREATMENT NOTE Patient Name: Jossy Borja        Date: 2019 : 1980   yes Patient  Verified Visit #:   2   of   12  Insurance: Payor: SENTHIL / Plan: Hector Yap RPN / Product Type: Commerical / In time: 1:00 Out time: 1:40 Total Treatment Time: 40 Medicare/[facility] Time Tracking (below) Total Timed Codes (min):  na 1:1 Treatment Time:  na  
TREATMENT AREA =  Bilateral knee pain [M25.561, M25.562] SUBJECTIVE Pain Level (on 0 to 10 scale):  7  / 10 Medication Changes/New allergies or changes in medical history, any new surgeries or procedures?    no  If yes, update Summary List  
Subjective Functional Status/Changes:  []  No changes reported It doesn't hurt like it did before. I can even take a few steps up after the last visit. OBJECTIVE 10 min Therapeutic Exercise:  [x]  See flow sheet Rationale:      increase ROM, increase strength and improve coordination to improve the patients ability to perform ADLs 25 min Manual Therapy: DTM B Quad, R piri, L IS ant inn akua, L4-5 L FRS akua, L mid foot mob Rationale:      decrease pain, increase ROM and increase tissue extensibility to improve patient's ability to perform ADLs Billed With/As: 
 [] TE 
 [] TA 
 [] Neuro 
 [] Self Care Patient Education: [x] Review HEP [] Progressed/Changed HEP based on:  
[] positioning   [] body mechanics   [] transfers   [] heat/ice application   
[] other:   
Other Objective/Functional Measures: 
 
Cont to have sig TTP noted at L RF and R piri Post Treatment Pain Level (on 0 to 10) scale:    10 ASSESSMENT Assessment/Changes in Function:  
 
Good razia to all Rx without increase in pain  
  
[]  See Progress Note/Recertification Patient will continue to benefit from skilled PT services to modify and progress therapeutic interventions, address functional mobility deficits and address ROM deficits to attain remaining goals. Progress toward goals / Updated goals: Independent with HEP  
 
PLAN [x]  Upgrade activities as tolerated yes Continue plan of care  
[]  Discharge due to :   
[]  Other:   
 
Therapist: Luther Nino PT Date: 1/29/2019 Time: 10:45 AM  
 
Future Appointments Date Time Provider Basil Trivedi 1/29/2019  1:00 PM Orlena Catching, PT [facility]  
1/31/2019  7:30 AM Orlena Catching, PT [facility]  
2/5/2019  4:30 PM Orlena Catching, PT [facility]  
2/7/2019  7:30 AM Orlena Catching, PT [facility]  
2/12/2019  4:30 PM Orlena Catching, PT [facility]  
2/14/2019  7:30 AM Orlena Catching, PT [facility]  
2/19/2019  4:30 PM Orlena Catching, PT [facility]  
2/21/2019  8:00 AM Orlena Catching, PT [facility]  
2/26/2019  4:30 PM Orlena Catching, PT [facility]  
2/28/2019  7:30 AM Orlena Catching, PT [facility]  
3/28/2019  7:30 AM Orlena Catching, PT [facility]

## 2019-01-29 NOTE — PROGRESS NOTES
[address] PHYSICAL THERAPY 
[address] [place] [address] [address],[place], [address] [facility] - Phone: [phone]  Fax: [phone] Request for use of Dry Needling/Intramuscular Manual Therapy Patient Name: Jaswant Nevarez : 1980 Treatment/Medical Diagnosis: Bilateral knee pain [M25.561, M25.562] Referral Source: Yonathan Barrett MD    
Date of Initial Visit: 1/15/19 Attended Visits: 2 Missed Visits: 0 Based on findings from the physical therapy examination and evaluation, the evaluating therapist believes the patient, Jaswant Nevarez would benefit from including Dry Needling as part of the plan of care. Dry needling is a treatment technique utilized in conjunction with other PT interventions to inactivate myofascial trigger points and the pain and dysfunction they cause. Dry Needling is an advanced procedure that requires additional training of intensive course work. PROCEDURE: 
? Solid filament sterile needle (typically 0.3mm/30 gauge) inserted into a trigger point ? Repeated movements inactivate the trigger points, taking 30-60 seconds per site ? Typically consists of 1 dry needling session per week and a possible second treatment including muscle re-education, flexibility, strengthening and other manual techniques to facilitate the benefits of dry needling BENEFITS: 
? Inactivation of trigger points ? Decreased pain ? Increased muscle length ? Improved movement patterns ? Restoration of function POTENTIAL RISKS: 
? Post-needling soreness ? Infection ? Bruising/bleeding ? Penetration of a nerve ? Pneumothorax All treating PTs have been thoroughly educated in avoiding adverse reactions If you agree with this recommendation, please sign the attached prescription form and fax it to us at [phone].   If you have questions or concerns regarding dry needling or any other treatment we may be providing, please contact us at [phone]. Thank you for allowing us to assist in the care of your patient. Therapist Signature: Salazar Meier, PT, OCS, SCS, CSCS Date: 1/29/2019 Time: 2:21 PM  
NOTE TO PHYSICIAN:  PLEASE COMPLETE THE ORDERS BELOW AND FAX TO [facility] Physical Therapy: [phone] If you are unable to process this request in 24 hours please contact our office: [phone] ? I have read the above request and AGREE to the recommendation of including dry needling as part of the plan of care. ? I have read the above request and DO NOT AGREE to including dry needling as part of the plan of care. ? I have read the above report and request that my patient continue therapy with the following changes/special instructions: _________________________________________________ Physician Signature:       Date:      Time:

## 2019-01-31 ENCOUNTER — HOSPITAL ENCOUNTER (OUTPATIENT)
Dept: PHYSICAL THERAPY | Age: 39
Discharge: HOME | End: 2019-01-31
Payer: COMMERCIAL

## 2019-01-31 PROCEDURE — 97110 THERAPEUTIC EXERCISES: CPT

## 2019-01-31 PROCEDURE — 97140 MANUAL THERAPY 1/> REGIONS: CPT

## 2019-01-31 NOTE — PROGRESS NOTES
PHYSICAL THERAPY - DAILY TREATMENT NOTE Patient Name: James Perez        Date: 2019 : 1980   yes Patient  Verified Visit #:   3   of   12  Insurance: Payor: Genie Duque / Plan: Chema Burns RPN / Product Type: Commerical / In time: 7:35 Out time: 8:25 Total Treatment Time: 50 Medicare/[facility] Time Tracking (below) Total Timed Codes (min):  na 1:1 Treatment Time:  na  
TREATMENT AREA =  Bilateral knee pain [M25.561, M25.562] SUBJECTIVE Pain Level (on 0 to 10 scale):  6  / 10 Medication Changes/New allergies or changes in medical history, any new surgeries or procedures?    no  If yes, update Summary List  
Subjective Functional Status/Changes:  []  No changes reported My LBP is better and my knees are better too, but still having hard time going up the steps OBJECTIVE 25 min Therapeutic Exercise:  [x]  See flow sheet Rationale:      increase ROM, increase strength and improve coordination to improve the patients ability to perform ADLs  
  
25 min Manual Therapy: DTM B Quad, R piri, L IS ant inn akua, L4-5 L FRS akua,   
Rationale:      decrease pain, increase ROM and increase tissue extensibility to improve patient's ability to perform ADLs Billed With/As: 
 [] TE 
 [] TA 
 [] Neuro 
 [] Self Care Patient Education: [x] Review HEP [] Progressed/Changed HEP based on:  
[] positioning   [] body mechanics   [] transfers   [] heat/ice application   
[] other:   
Other Objective/Functional Measures: 
 
Cont to have increased soft tissue tension noted at L para Post Treatment Pain Level (on 0 to 10) scale:   0  / 10 ASSESSMENT Assessment/Changes in Function:  
 
Good razia to all Rx without increase in pain  
  
[]  See Progress Note/Recertification Patient will continue to benefit from skilled PT services to modify and progress therapeutic interventions, address functional mobility deficits, address ROM deficits and address strength deficits to attain remaining goals. 
Progress toward goals / Updated goals: 
Slowly progressing with pain reduciton PLAN [x]  Upgrade activities as tolerated yes Continue plan of care  
[]  Discharge due to :   
[]  Other:   
 
Therapist: Howard Culp PT Date: 1/31/2019 Time: 6:29 AM  
 
Future Appointments Date Time Provider Basil Trivedi 1/31/2019  7:30 AM Tory Mcintyre, PT [facility]  
2/5/2019  4:30 PM Tory Mcintyre, PT [facility]  
2/7/2019  7:30 AM Tory Mcintyre, PT [facility]  
2/12/2019  4:30 PM Tory Mcintyre, PT [facility]  
2/14/2019  7:30 AM Tory Mcintyre, PT [facility]  
2/19/2019  4:30 PM Tory Mcintyre, PT [facility]  
2/21/2019  8:00 AM Tory Mcintyre, PT [facility]  
2/26/2019  4:30 PM Tory Mcintyre, PT [facility]  
2/28/2019  7:30 AM Tory Mcintyre, PT [facility]  
3/28/2019  7:30 AM Tory Mcintyre, PT [facility]

## 2019-02-01 ENCOUNTER — APPOINTMENT (OUTPATIENT)
Dept: PHYSICAL THERAPY | Age: 39
End: 2019-02-01
Payer: COMMERCIAL

## 2019-02-05 ENCOUNTER — APPOINTMENT (OUTPATIENT)
Dept: PHYSICAL THERAPY | Age: 39
End: 2019-02-05
Payer: COMMERCIAL

## 2019-02-07 ENCOUNTER — HOSPITAL ENCOUNTER (OUTPATIENT)
Dept: PHYSICAL THERAPY | Age: 39
Discharge: HOME | End: 2019-02-07
Payer: COMMERCIAL

## 2019-02-07 PROCEDURE — 97140 MANUAL THERAPY 1/> REGIONS: CPT

## 2019-02-07 PROCEDURE — 97110 THERAPEUTIC EXERCISES: CPT

## 2019-02-07 NOTE — PROGRESS NOTES
PHYSICAL THERAPY - DAILY TREATMENT NOTE Patient Name: James Perez        Date: 2019 : 1980   yes Patient  Verified Visit #:   4   of   12  Insurance: Payor: Genie Duque / Plan: Chema Burns RPN / Product Type: Commerical / In time: 7:25 Out time: 8:04 Total Treatment Time: 44 Medicare/[facility] Time Tracking (below) Total Timed Codes (min):  na 1:1 Treatment Time:  na  
TREATMENT AREA =  Bilateral knee pain [M25.561, M25.562] SUBJECTIVE Pain Level (on 0 to 10 scale):  8  / 10 Medication Changes/New allergies or changes in medical history, any new surgeries or procedures?    no  If yes, update Summary List  
Subjective Functional Status/Changes:  []  No changes reported My LB is feeling better but my knee is still killing me. OBJECTIVE 25 min Therapeutic Exercise:  [x]  See flow sheet Rationale:      increase ROM, increase strength and improve coordination to improve the patients ability to perform ADLs  
  
14 min Manual Therapy: DTM B Quad, R piri, L IS ant inn akua, L4-5 L FRS akua,   
Rationale:      decrease pain, increase ROM and increase tissue extensibility to improve patient's ability to perform ADLs Billed With/As: 
 [] TE 
 [] TA 
 [] Neuro 
 [] Self Care Patient Education: [x] Review HEP [] Progressed/Changed HEP based on:  
[] positioning   [] body mechanics   [] transfers   [] heat/ice application   
[] other:   
Other Objective/Functional Measures: 
 
Cont to have sig TTP at [employer] Post Treatment Pain Level (on 0 to 10) scale:   8  / 10 ASSESSMENT Assessment/Changes in Function:  
 
Good razia to all Rx without increase in pain  
  
[]  See Progress Note/Recertification Patient will continue to benefit from skilled PT services to modify and progress therapeutic interventions, address functional mobility deficits and address ROM deficits to attain remaining goals. Progress toward goals / Updated goals: 
Slow progress with pain reduction PLAN 
[x]  Upgrade activities as tolerated yes Continue plan of care  
[]  Discharge due to :   
[]  Other:   
 
Therapist: Khurram Brandt PT Date: 2/7/2019 Time: 6:30 AM  
 
Future Appointments Date Time Provider Basil Trivedi 2/7/2019  7:30 AM Odalis Crespo, PT [facility]  
2/12/2019  4:30 PM Odalis Crespo, PT [facility]  
2/14/2019  7:30 AM Odalis Crespo, PT [facility]  
2/19/2019  4:30 PM Odalis Crespo, PT [facility]  
2/21/2019  8:00 AM Odalis Crespo, PT [facility]  
2/26/2019  4:30 PM Odalis Crespo, PT [facility]  
2/28/2019  7:30 AM Odalis Crespo, PT [facility]  
3/28/2019  7:30 AM Odalis Crespo, PT [facility]

## 2019-02-12 ENCOUNTER — HOSPITAL ENCOUNTER (OUTPATIENT)
Dept: PHYSICAL THERAPY | Age: 39
Discharge: HOME | End: 2019-02-12
Payer: COMMERCIAL

## 2019-02-12 PROCEDURE — 97140 MANUAL THERAPY 1/> REGIONS: CPT

## 2019-02-12 PROCEDURE — 97110 THERAPEUTIC EXERCISES: CPT

## 2019-02-12 NOTE — PROGRESS NOTES
PHYSICAL THERAPY - DAILY TREATMENT NOTE Patient Name: Adolfo Murphy        Date: 2019 : 1980   yes Patient  Verified Visit #:     Insurance: Payor: Barron Fletcher / Plan: Anayeli Ray RPN / Product Type: Commerical / In time: 4:30 Out time: 5:05 Total Treatment Time: 35 Medicare/[facility] Time Tracking (below) Total Timed Codes (min):  na 1:1 Treatment Time:  na  
TREATMENT AREA =  Bilateral knee pain [M25.561, M25.562] SUBJECTIVE Pain Level (on 0 to 10 scale):  8  / 10 Medication Changes/New allergies or changes in medical history, any new surgeries or procedures?    no  If yes, update Summary List  
Subjective Functional Status/Changes:  []  No changes reported Walking is ok, but stairs is what kill me. It feels like inside the knee OBJECTIVE 25 min Therapeutic Exercise:  [x]  See flow sheet Rationale:      increase ROM, increase strength and improve coordination to improve the patients ability to perform ADLs  
  
10 min Manual Therapy: DTM B Quad, R piri, L IS ant inn akua, L4-5 L FRS akua,   
Rationale:      decrease pain, increase ROM and increase tissue extensibility to improve patient's ability to perform ADLs Billed With/As: 
 [] TE 
 [] TA 
 [] Neuro 
 [] Self Care Patient Education: [x] Review HEP [] Progressed/Changed HEP based on:  
[] positioning   [] body mechanics   [] transfers   [] heat/ice application   
[] other:   
Other Objective/Functional Measures: 
 
Sig crepetis with knee flx on L Post Treatment Pain Level (on 0 to 10) scale:   8  / 10 ASSESSMENT Assessment/Changes in Function:  
 
Good razia to all Rx without increase in pain  
  
[]  See Progress Note/Recertification Patient will continue to benefit from skilled PT services to modify and progress therapeutic interventions, address functional mobility deficits and address ROM deficits to attain remaining goals. Progress toward goals / Updated goals: Recommended ortho consult PLAN [x]  Upgrade activities as tolerated yes Continue plan of care  
[]  Discharge due to :   
[]  Other:   
 
Therapist: Gordon Edmonds PT Date: 2/12/2019 Time: 9:56 AM  
 
Future Appointments Date Time Provider Basil Trivedi 2/12/2019  4:30 PM Allyson Carty, PT [facility]  
2/14/2019  7:30 AM Allyson Carty, PT [facility]  
2/19/2019  4:30 PM Allyson Carty PT [facility]  
2/21/2019  8:00 AM Allyson Carty PT [facility]  
2/26/2019  4:30 PM Allyson Carty, PT [facility]  
2/28/2019  7:30 AM Allyson Carty PT [facility]  
3/28/2019  7:30 AM Allyson Carty, PT [facility]

## 2019-02-14 ENCOUNTER — APPOINTMENT (OUTPATIENT)
Dept: PHYSICAL THERAPY | Age: 39
End: 2019-02-14
Payer: COMMERCIAL

## 2019-02-15 NOTE — PROGRESS NOTES
[address] PHYSICAL THERAPY 
[address] [place] [address], [place] [address],[facility], 70 [facility] - Phone: [phone]  Fax: [phone] PROGRESS NOTE Patient Name: Kitty Yeager : 1980 Treatment/Medical Diagnosis: Bilateral knee pain [M25.561, M25.562] Referral Source: Kamran Figueredo MD    
Date of Initial Visit: 1/15/19 Attended Visits: 5 Missed Visits: 0  
SUMMARY OF TREATMENT Kitty Yeager has been seen at our clinic 2-3x/wk for a total of 5 visits. Pt treatment has consisted of  therapeutic exercise forLE strengthening, hip/core strengthening, and manual therapy (jt mobilization and deep tissue mobilization/dry needling) CURRENT STATUS Pt has had a good tolerance to physical therapy treatment. She reports reports improved pain in her lower back region and reports min pain in her knees with walking. However, she continues to complain of increase in pain with stairs. The onset of her pain appears to be angle specific (flx > ~30-40 deg). We feel that she may benefit from further diagnostic study at this point (? Chondral lesion). Goal/Measure of Progress Goal Met? 1. Decrease Max pain by 50-75% to assist with ADLs Status at last Eval: 9/10 Current Status: 8/10 progressing 2. Increase FOTO score to 55 % show functional improvement Status at last Eval: 26% Current Status: 36% progressing 3. Will rate >/= +5 on Global Rating of Change and be prepared to DC to HEP. Status at last Eval: na Current Status: 0 no New Goals to be achieved in __4__  weeks: 1. Continue with all goals above 2.    
3.    
RECOMMENDATIONS Specifics: 2-3x/wk x 4 more wksIf you have any questions/comments please contact us directly at [phone]. Thank you for allowing us to assist in the care of your patient. Therapist Signature: Leonard Connolly, DPTYRELL, OCS, SCS, CSCS Date: 2/15/2019   Time: 2:49 PM  
 NOTE TO PHYSICIAN:  PLEASE COMPLETE THE ORDERS BELOW AND FAX TO [facility] Physical Therapy: [phone] If you are unable to process this request in 24 hours please contact our office: [phone] 
 
___ I have read the above report and request that my patient continue as recommended.  
___ I have read the above report and request that my patient continue therapy with the following changes/special instructions:_________________________________________________________  
___ I have read the above report and request that my patient be discharged from therapy.   
 
Physician Signature:       Date:      Time:

## 2019-02-19 ENCOUNTER — HOSPITAL ENCOUNTER (OUTPATIENT)
Dept: PHYSICAL THERAPY | Age: 39
Discharge: HOME | End: 2019-02-19
Payer: COMMERCIAL

## 2019-02-19 PROCEDURE — 97140 MANUAL THERAPY 1/> REGIONS: CPT

## 2019-02-19 PROCEDURE — 97110 THERAPEUTIC EXERCISES: CPT

## 2019-02-19 NOTE — PROGRESS NOTES
PHYSICAL THERAPY - DAILY TREATMENT NOTE Patient Name: Poly Cruz        Date: 2019 : 1980   yes Patient  Verified Visit #:      12  Insurance: Payor: Elizabeth Hair / Plan: Adina Monge RPN / Product Type: Commerical / In time: 4:45 Out time: 5:20 Total Treatment Time: 45 Medicare/[facility] Time Tracking (below) Total Timed Codes (min):  na 1:1 Treatment Time:  na  
TREATMENT AREA =  Bilateral knee pain [M25.561, M25.562] SUBJECTIVE Pain Level (on 0 to 10 scale):  7  / 10 Medication Changes/New allergies or changes in medical history, any new surgeries or procedures?    no  If yes, update Summary List  
Subjective Functional Status/Changes:  []  No changes reported A little better than last time, but it still hurts OBJECTIVE 15 min Therapeutic Exercise:  [x]  See flow sheet Rationale:      increase ROM, increase strength and improve coordination to improve the patients ability to perform ADLs  
  
20 min Manual Therapy: DTM B Quad, R piri, shot gun tech, L3L FRS akua,   
Rationale:      decrease pain, increase ROM and increase tissue extensibility to improve patient's ability to perform ADLs Billed With/As: 
 [] TE 
 [] TA 
 [] Neuro 
 [] Self Care Patient Education: [x] Review HEP [] Progressed/Changed HEP based on:  
[] positioning   [] body mechanics   [] transfers   [] heat/ice application   
[] other:   
Other Objective/Functional Measures: FOTO = 36 
GROC = 0 Post Treatment Pain Level (on 0 to 10) scale:   7  / 10 ASSESSMENT Assessment/Changes in Function:  
 
Cont to have difficulty performing QP ex secondary to pain  
  
[]  See Progress Note/Recertification Patient will continue to benefit from skilled PT services to modify and progress therapeutic interventions, address functional mobility deficits, address ROM deficits and address strength deficits to attain remaining goals. Progress toward goals / Updated goals: No sig change towards LTGs PLAN [x]  Upgrade activities as tolerated yes Continue plan of care  
[]  Discharge due to :   
[]  Other:   
 
Therapist: Estel Felty, PT Date: 2/19/2019 Time: 12:55 PM  
 
Future Appointments Date Time Provider Basil Trivedi 2/19/2019  4:30 PM Ynes Palumbo, PT [facility]  
2/26/2019  4:30 PM Ynes Palumbo, PT [facility]  
2/28/2019  7:30 AM Ynes Palumbo, PT [facility]

## 2019-02-21 ENCOUNTER — APPOINTMENT (OUTPATIENT)
Dept: PHYSICAL THERAPY | Age: 39
End: 2019-02-21
Payer: COMMERCIAL

## 2019-02-26 ENCOUNTER — HOSPITAL ENCOUNTER (OUTPATIENT)
Dept: PHYSICAL THERAPY | Age: 39
Discharge: HOME | End: 2019-02-26
Payer: COMMERCIAL

## 2019-02-26 PROCEDURE — 97140 MANUAL THERAPY 1/> REGIONS: CPT

## 2019-02-26 NOTE — PROGRESS NOTES
PHYSICAL THERAPY - DAILY TREATMENT NOTE Patient Name: Homer Marquez        Date: 2019 : 1980   yes Patient  Verified Visit #:     Insurance: Payor: Corrie Andrew / Plan: Samantha Aldana RPN / Product Type: Commerical / In time: 4:30 Out time: 4:55 Total Treatment Time: 25 Medicare/[facility] Time Tracking (below) Total Timed Codes (min):  na 1:1 Treatment Time:  na  
TREATMENT AREA =  Bilateral knee pain [M25.561, M25.562] SUBJECTIVE Pain Level (on 0 to 10 scale):  7  / 10 Medication Changes/New allergies or changes in medical history, any new surgeries or procedures?    no  If yes, update Summary List  
Subjective Functional Status/Changes:  []  No changes reported No change. I feel a little better in my back after I leave here but the knees are the same OBJECTIVE 
  
25 min Manual Therapy: DTM B Quad, R piri, shot gun tech, L3L FRS akua,   
Rationale:      decrease pain, increase ROM and increase tissue extensibility to improve patient's ability to perform ADLs Billed With/As: 
 [] TE 
 [] TA 
 [] Neuro 
 [] Self Care Patient Education: [x] Review HEP [] Progressed/Changed HEP based on:  
[] positioning   [] body mechanics   [] transfers   [] heat/ice application   
[] other:   
Other Objective/Functional Measures: 
 
Discussed holding PT until ortho consult and determine the course of action Post Treatment Pain Level (on 0 to 10) scale:   7  / 10 ASSESSMENT Assessment/Changes in Function:  
 
Good razia to all Rx without increase in pain  
  
[]  See Progress Note/Recertification Patient will continue to benefit from skilled PT services to modify and progress therapeutic interventions, address functional mobility deficits and address ROM deficits to attain remaining goals. Progress toward goals / Updated goals: No change towards LTG. Hold PT until orth consult PLAN [x]  Upgrade activities as tolerated yes Continue plan of care []  Discharge due to :   
[]  Other:   
 
Therapist: Gordon Edmonds PT Date: 2/26/2019 Time: 9:57 AM  
 
Future Appointments Date Time Provider Basil Trivedi 2/26/2019  4:30 PM Allyson Carty PT [facility]  
2/28/2019  7:30 AM Allyson Carty PT [facility]

## 2019-02-28 ENCOUNTER — HOSPITAL ENCOUNTER (OUTPATIENT)
Dept: PHYSICAL THERAPY | Age: 39
End: 2019-02-28
Payer: COMMERCIAL

## 2019-03-18 NOTE — PROGRESS NOTES
[address] PHYSICAL THERAPY  [address] [place] [address], [place] [address],[facility], 70 [facility] - Phone: [phone]  Fax: [phone]  DISCHARGE SUMMARY  Patient Name: Kitty Yeager : 1980   Treatment/Medical Diagnosis: Bilateral knee pain [M25.561, M25.562]   Referral Source: Kamran Figueredo MD       Date of Initial Visit: 1/15/19 Attended Visits: 7 Missed Visits: 0      SUMMARY OF TREATMENT  Kitty Yeager has been seen at our clinic 2-3x/wk for a total of 7 visits. Pt treatment has consisted of  therapeutic exercise forLE strengthening, hip/core strengthening, and manual therapy (jt mobilization and deep tissue mobilization/dry needling)  CURRENT STATUS    Pt has had a good tolerance to physical therapy treatment. She reports reports improved pain in her lower back region and reports min pain in her knees with walking on flat surface. However, she continues to complain of increase in pain with stairs. The onset of her pain appears to be angle specific (flx > ~30-40 deg). We feel that her progress has reached its plateau at this point. Therefore, we would like to discharger her from PT treatment at this time.            Goal/Measure of Progress Goal Met? 1. Decrease Max pain by 50-75% to assist with ADLs   Status at last Eval: 9/10 Current Status: 8/10 progressing   2. Increase FOTO score to 55 % show functional improvement   Status at last Eval: 26% Current Status: 36% progressing   3. Will rate >/= +5 on Global Rating of Change and be prepared to DC to HEP. Status at last Eval: na Current Status: 0 no      RECOMMENDATIONS  Discharge from physical therapy treatment with HEP. Specifics:  If you have any questions/comments please contact us directly at [phone]. Thank you for allowing us to assist in the care of your patient.     Therapist Signature: Leonard Connolly, WILLIE, OCS, SCS, CSCS Date: 3/18/2019     Time: 7:21 AM

## 2019-03-28 ENCOUNTER — APPOINTMENT (OUTPATIENT)
Dept: PHYSICAL THERAPY | Age: 39
End: 2019-03-28

## 2020-10-01 ENCOUNTER — HOSPITAL ENCOUNTER (OUTPATIENT)
Dept: PHYSICAL THERAPY | Age: 40
Discharge: HOME | End: 2020-10-01
Payer: MEDICAID

## 2020-10-01 PROCEDURE — 97162 PT EVAL MOD COMPLEX 30 MIN: CPT

## 2020-10-01 PROCEDURE — 97535 SELF CARE MNGMENT TRAINING: CPT

## 2020-10-01 PROCEDURE — 97140 MANUAL THERAPY 1/> REGIONS: CPT

## 2020-10-01 NOTE — PROGRESS NOTES
[address] [facility] PHYSICAL THERAPY  [address] [place] [address] [address],[facility], [address] - Phone: [phone]  Fax: [phone] / [address] [facility]  Patient Name: Robert Zavala : 1980   Medical   Diagnosis: Low back pain [M54.5] Treatment Diagnosis: Low back pain [M54.5]   Onset Date: ~6month ago      Referral Source: Siena López MD Start of Care [facility], [place]): 10/1/2020   Prior Hospitalization: See medical history Provider #: [id number]   Prior Level of Function: Limited ADLs due to recurrent LBP   Comorbidities: OA   Medications: Verified on Patient Summary List   The Plan of Care and following information is based on the information from the initial evaluation.   ===========================================================================================  Assessment / valdivia information:  Robert Zavala is a 41 y.o.  yo female with Dx of Low back pain [M54.5]. She reports the most recent onset of recurrent LBP ~6month ago. She currently rates her pain as 5/10 at worst, 2/10 at best, primarily located at R hip and lower lumbar region. She complains of difficulty and increase pain with walking and laying on her side. Objective Findings:  Lumbar ROM: Flx  = WNL, Ext = limited by 80%, Rot: R = limited by 30%, L = WNL. Manual Muscle Testing:   B hip abd and ext are Reduced. Lumbar/SI Screening:  B iliosacral ant innominate, L3-5 L FRS, L on L Si innominate noted. Palpation:  Significant tenderness to palpation at B psoas.   Pt instructed in HEP and will f/u in clinic for PT.  ===========================================================================================  Eval Complexity: History MEDIUM  Complexity : 1-2 comorbidities / personal factors will impact the outcome/ POC ;  Examination  MEDIUM Complexity : 3 Standardized tests and measures addressing body structure, function, activity limitation and / or participation in recreation ; Presentation MEDIUM Complexity : Evolving with changing characteristics ; Decision Making MEDIUM Complexity : FOTO score of 26-74; Overall Complexity MEDIUM  Problem List: pain affecting function, decrease ROM, decrease strength, decrease ADL/ functional abilitiies, decrease activity tolerance and decrease flexibility/ joint mobility   Treatment Plan may include any combination of the following: Therapeutic exercise, Therapeutic activities, Neuromuscular re-education, Physical agent/modality, Manual therapy and Patient education  Patient / Family readiness to learn indicated by: asking questions  Persons(s) to be included in education: patient (P)  Barriers to Learning/Limitations: None  Measures taken, if barriers to learning:    Patient Goal (s): Decrease pain    Patient self reported health status: good  Rehabilitation Potential: good     Short Term Goals: To be accomplished in  1-2  weeks:  1. Independent with HEP. 2. Decrease max pain 25-50% to assist with amb   Long Term Goals: To be accomplished in  3-4  weeks:  1. Decrease max pain 50-75% to assist with amb  2. Increase FOTO score to 66% to show functional improvment. 3.  Will rate  >/= +5 on Global Rating of Change and be prepared to DC to HEP. Frequency / Duration:   Patient to be seen  2-3  times per week for 3-4  weeks:  Patient / Caregiver education and instruction: self care and exercises    Therapist Signature: Tawanda Landry, DPT, OCS, SCS, CSCS Date: 62/9/4366   Certification Period: na Time: 10:54 AM   ===========================================================================================  I certify that the above Physical Therapy Services are being furnished while the patient is under my care. I agree with the treatment plan and certify that this therapy is necessary.     Physician Signature:        Date:       Time:     Please sign and return to [facility] or you may fax the signed copy to [phone]. Thank you.

## 2020-10-01 NOTE — PROGRESS NOTES
PHYSICAL THERAPY - DAILY TREATMENT NOTE    Patient Name: Yarely Andersen        Date: 10/1/2020  : 1980   YES Patient  Verified  Visit #:      12  Insurance: Payor: BLUE CROSS MEDICAID / Plan: [facility] HEALTHKEEPERS PLUS / Product Type: Managed Care Medicaid /      In time: 10:00 Out time: 10:45   Total Treatment Time: 45     Medicare Time Tracking (below)   Total Timed Codes (min):  45 1:1 Treatment Time:  45     TREATMENT AREA =  Low back pain [M54.5]    SUBJECTIVE  Pain Level (on 0 to 10 scale):    Medication Changes/New allergies or changes in medical history, any new surgeries or procedures? NO    If yes, update Summary List   Subjective Functional Status/Changes:  []  No changes reported     SEE IE          OBJECTIVE    15 min Manual Therapy: DTM B psoas, B IS ant inn akua, L3-5 L FRS, L on L SI akua   Rationale:      decrease pain, increase ROM and increase tissue extensibility to improve patient's ability to perform ADLs    10 min Self Care: B hip shift, diaphragmatic breath with emphasis on exhalation   Rationale:    increase ROM, increase strength and improve coordination to improve the patients ability to perform ADLs      Billed With/As:   [] TE   [] TA   [] Neuro   [x] Self Care Patient Education: [x] Review HEP    [] Progressed/Changed HEP based on:   [] positioning   [] body mechanics   [] transfers   [] heat/ice application    [] other:       min Patient Education:  YES  Reviewed HEP   []  Progressed/Changed HEP based on:         Other Objective/Functional Measures:    SEE IE     Post Treatment Pain Level (on 0 to 10) scale:       ASSESSMENT  Assessment/Changes in Function:     SEE IE     []  See Progress Note/Recertification   Patient will continue to benefit from skilled PT services to modify and progress therapeutic interventions, address functional mobility deficits, address ROM deficits, address strength deficits, analyze and address soft tissue restrictions, analyze and cue movement patterns, analyze and modify body mechanics/ergonomics and assess and modify postural abnormalities to attain remaining goals.    Progress toward goals / Updated goals:         PLAN  []  Upgrade activities as tolerated YES Continue plan of care   []  Discharge due to :    []  Other:      Therapist: Maricarmen Hancock, PT, OCS, SCS, CSCS    Date: 10/1/2020 Time: 10:52 AM       Future Appointments   Date Time Provider Basil Trivedi   10/2/2020  7:45 AM Oral Neighbor, PT [address]   10/7/2020  7:45 AM Oral Neighbor, PT [facility]   10/9/2020  7:45 AM Oral Neighbor, PT [address]

## 2020-10-02 ENCOUNTER — HOSPITAL ENCOUNTER (OUTPATIENT)
Dept: PHYSICAL THERAPY | Age: 40
Discharge: HOME | End: 2020-10-02
Payer: MEDICAID

## 2020-10-02 PROCEDURE — 97110 THERAPEUTIC EXERCISES: CPT

## 2020-10-02 PROCEDURE — 97112 NEUROMUSCULAR REEDUCATION: CPT

## 2020-10-02 NOTE — PROGRESS NOTES
PHYSICAL THERAPY - DAILY TREATMENT NOTE    Patient Name: Kimberlee Cadena        Date: 10/2/2020  : 1980   yes Patient  Verified  Visit #:   2   of   12  Insurance: Payor: BLUE CROSS MEDICAID / Plan: [facility] HEALTHKEEPERS PLUS / Product Type: Managed Care Medicaid /      In time: 7:50 Out time: 8:22   Total Treatment Time: 32     Medicare/BCBS Time Tracking (below)   Total Timed Codes (min):  32 1:1 Treatment Time:  32     TREATMENT AREA =  Low back pain [M54.5]  SUBJECTIVE  Pain Level (on 0 to 10 scale):  5  / 10   Medication Changes/New allergies or changes in medical history, any new surgeries or procedures?    no  If yes, update Summary List   Subjective Functional Status/Changes:  []  No changes reported     My LB felt better after the last visit, but my hip is still very sore          OBJECTIVE    10 min Therapeutic Exercise:  [x]  See flow sheet   Rationale:      increase ROM, increase strength and improve coordination to improve the patients ability to perform ADLs     10 min Neuromuscular Re-ed: [x]  See flow sheet   Rationale:    improve coordination to improve the patients ability to perform ADLs      7 min Manual Therapy: DTM B psoas, B IS ant inn akua,   Rationale:      decrease pain, increase ROM and increase tissue extensibility to improve patient's ability to perform ADLs      Billed With/As:   [] TE   [] TA   [] Neuro   [] Self Care Patient Education: [x] Review HEP    [] Progressed/Changed HEP based on:   [] positioning   [] body mechanics   [] transfers   [] heat/ice application    [] other:      Other Objective/Functional Measures:    Cont to have increased soft tissue tension noted B psoas     Post Treatment Pain Level (on 0 to 10) scale:   3  / 10     ASSESSMENT  Assessment/Changes in Function:   Cont to c/o pain with R sidelying      []  See Progress Note/Recertification   Patient will continue to benefit from skilled PT services to modify and progress therapeutic interventions, address functional mobility deficits and address ROM deficits to attain remaining goals. Progress toward goals / Updated goals:     Independent with HEP     PLAN  [x]  Upgrade activities as tolerated yes Continue plan of care   []  Discharge due to :    []  Other:      Therapist: Naina Aguilera, PT    Date: 10/2/2020 Time: 6:50 AM     Future Appointments   Date Time Provider Basil Trivedi   10/2/2020  7:45 AM Chip Bee,  [address] [facility]   10/7/2020  7:45 AM Chip Bee, PT [address]   10/9/2020  7:45 AM Chip Bee, PT [address]

## 2020-10-07 ENCOUNTER — HOSPITAL ENCOUNTER (OUTPATIENT)
Dept: PHYSICAL THERAPY | Age: 40
Discharge: HOME | End: 2020-10-07
Payer: MEDICAID

## 2020-10-07 PROCEDURE — 97110 THERAPEUTIC EXERCISES: CPT

## 2020-10-07 PROCEDURE — 97112 NEUROMUSCULAR REEDUCATION: CPT

## 2020-10-09 ENCOUNTER — HOSPITAL ENCOUNTER (OUTPATIENT)
Dept: PHYSICAL THERAPY | Age: 40
Discharge: HOME | End: 2020-10-09
Payer: MEDICAID

## 2020-10-09 PROCEDURE — 97110 THERAPEUTIC EXERCISES: CPT

## 2020-10-09 PROCEDURE — 97112 NEUROMUSCULAR REEDUCATION: CPT

## 2020-10-09 NOTE — PROGRESS NOTES
PHYSICAL THERAPY - DAILY TREATMENT NOTE    Patient Name: Remigio Martinez        Date: 10/9/2020  : 1980   yes Patient  Verified  Visit #:   3   of   12  Insurance: Payor: BLUE CROSS MEDICAID / Plan: [facility] HEALTHKEEPERS PLUS / Product Type: Managed Care Medicaid /      In time: 7:50 Out time: 8:17   Total Treatment Time: 27     Medicare/BCBS Time Tracking (below)   Total Timed Codes (min):  27 1:1 Treatment Time:  27     TREATMENT AREA =  Low back pain [M54.5]  SUBJECTIVE  Pain Level (on 0 to 10 scale):  2  / 10   Medication Changes/New allergies or changes in medical history, any new surgeries or procedures?    no  If yes, update Summary List   Subjective Functional Status/Changes:  []  No changes reported     I think im sore from working out. Just sore at the hips          OBJECTIVE    10 min Therapeutic Exercise:  [x]? See flow sheet   Rationale:      increase ROM, increase strength and improve coordination to improve the patients ability to perform ADLs      10 min Neuromuscular Re-ed: [x]?   See flow sheet   Rationale:    improve coordination to improve the patients ability to perform ADLs        7 min Manual Therapy: DTM B psoas, B IS ant inn akua,   Rationale:      decrease pain, increase ROM and increase tissue extensibility to improve patient's ability to perform ADLs      Billed With/As:   [] TE   [] TA   [] Neuro   [] Self Care Patient Education: [x] Review HEP    [] Progressed/Changed HEP based on:   [] positioning   [] body mechanics   [] transfers   [] heat/ice application    [] other:      Other Objective/Functional Measures:    Cont to have increased soft tissue tension noted at B psoas     Post Treatment Pain Level (on 0 to 10) scale:   1  / 10     ASSESSMENT  Assessment/Changes in Function:     razia all ex without increase in pain      []  See Progress Note/Recertification   Patient will continue to benefit from skilled PT services to modify and progress therapeutic interventions, address functional mobility deficits and address ROM deficits to attain remaining goals.    Progress toward goals / Updated goals:    Progressing with pain reduction      PLAN  [x]  Upgrade activities as tolerated yes Continue plan of care   []  Discharge due to :    []  Other:      Therapist: Nanci Camacho PT    Date: 10/9/2020 Time: 7:32 AM     Future Appointments   Date Time Provider Basil Trivedi   10/9/2020  7:45 AM Sreekanth Viramontes, PT [facility]   10/14/2020 11:30 AM Sreekanth Viramontes, [id number]

## 2020-10-14 ENCOUNTER — HOSPITAL ENCOUNTER (OUTPATIENT)
Dept: PHYSICAL THERAPY | Age: 40
Discharge: HOME | End: 2020-10-14
Payer: MEDICAID

## 2020-10-14 PROCEDURE — 97140 MANUAL THERAPY 1/> REGIONS: CPT

## 2020-10-14 NOTE — PROGRESS NOTES
PHYSICAL THERAPY - DAILY TREATMENT NOTE    Patient Name: Ammy Alvarenga        Date: 10/14/2020  : 1980   yes Patient  Verified  Visit #:      of   12  Insurance: Payor: BLUE CROSS MEDICAID / Plan: [facility] HEALTHKEEPERS PLUS / Product Type: Managed Care Medicaid /      In time: 11:30 Out time: 12:00   Total Treatment Time: 30     Medicare/BCBS Time Tracking (below)   Total Timed Codes (min):  30 1:1 Treatment Time:  30     TREATMENT AREA =  Low back pain [M54.5]  SUBJECTIVE  Pain Level (on 0 to 10 scale):  2  / 10   Medication Changes/New allergies or changes in medical history, any new surgeries or procedures?    no  If yes, update Summary List   Subjective Functional Status/Changes:  []  No changes reported     LB has been pretty good. OBJECTIVE  15 min Therapeutic Exercise:  [x]? ?  See flow sheet   Rationale:      increase ROM, increase strength and improve coordination to improve the patients ability to perform ADLs      10 min Neuromuscular Re-ed: [x]? ?  See flow sheet   Rationale:    improve coordination to improve the patients ability to perform ADLs        5 min Manual Therapy: DTM L psoas, B IS ant inn akua,L4-5 L FRS akua   Rationale:      decrease pain, increase ROM and increase tissue extensibility to improve patient's ability to perform ADLs      Billed With/As:   [] TE   [] TA   [] Neuro   [] Self Care Patient Education: [x] Review HEP    [] Progressed/Changed HEP based on:   [] positioning   [] body mechanics   [] transfers   [] heat/ice application    [] other:      Other Objective/Functional Measures:    Sig difficulty w/  DNS star     Post Treatment Pain Level (on 0 to 10) scale:   1  / 10     ASSESSMENT  Assessment/Changes in Function:     C/o Fatigue with ex noted     []  See Progress Note/Recertification   Patient will continue to benefit from skilled PT services to modify and progress therapeutic interventions, address functional mobility deficits and address ROM deficits to attain remaining goals.    Progress toward goals / Updated goals:    Slowly progressing with pain reduction      PLAN  [x]  Upgrade activities as tolerated yes Continue plan of care   []  Discharge due to :    []  Other:      Therapist: Lakshmi Ching, PT    Date: 10/14/2020 Time: 6:53 AM     Future Appointments   Date Time Provider Basil Trivedi   10/14/2020 11:30 AM Kelleen Fall, PT [facility] [place] [facility]   11/6/2020  7:45 AM Kelleen Fall, PT [address]   11/9/2020  8:30 AM Kelleen Fall, PT [address]   11/13/2020 10:00 AM Kelleen Fall, PT [address]   11/17/2020 10:00 AM Kelleen Fall, PT [facility]   11/20/2020  8:30 AM Kelleen Fall, PT [facility]   11/23/2020  8:30 AM Kelleen Fall, PT [address]

## 2020-10-16 ENCOUNTER — HOSPITAL ENCOUNTER (OUTPATIENT)
Dept: PHYSICAL THERAPY | Age: 40
Discharge: HOME | End: 2020-10-16
Payer: MEDICAID

## 2020-10-16 PROCEDURE — 97110 THERAPEUTIC EXERCISES: CPT

## 2020-10-16 PROCEDURE — 97112 NEUROMUSCULAR REEDUCATION: CPT

## 2020-10-16 NOTE — PROGRESS NOTES
PHYSICAL THERAPY - DAILY TREATMENT NOTE    Patient Name: Morgan Garcia        Date: 10/16/2020  : 1980   yes Patient  Verified  Visit #:      of   12  Insurance: Payor: BLUE CROSS MEDICAID / Plan: [facility] [place] HEALTHKEEPERS PLUS / Product Type: Managed Care Medicaid /      In time: 9:20 Out time: 9:52   Total Treatment Time: 32     Medicare/BCBS Time Tracking (below)   Total Timed Codes (min):  32 1:1 Treatment Time:  32     TREATMENT AREA =  Low back pain [M54.5]  SUBJECTIVE  Pain Level (on 0 to 10 scale):  2  / 10   Medication Changes/New allergies or changes in medical history, any new surgeries or procedures?    no  If yes, update Summary List   Subjective Functional Status/Changes:  []  No changes reported     Its just at the side of my L hip that hurts. LB has elissa doing pretty good. OBJECTIVE  15 min Therapeutic Exercise:  [x]? ??  See flow sheet   Rationale:      increase ROM, increase strength and improve coordination to improve the patients ability to perform ADLs      10 min Neuromuscular Re-ed: [x]???  See flow sheet   Rationale:    improve coordination to improve the patients ability to perform ADLs        5 min Manual Therapy: DTM L psoas, B IS ant inn akua,L4-5 L FRS akua   Rationale:      decrease pain, increase ROM and increase tissue extensibility to improve patient's ability to perform ADLs      Billed With/As:   [] TE   [] TA   [] Neuro   [] Self Care Patient Education: [x] Review HEP    [] Progressed/Changed HEP based on:   [] positioning   [] body mechanics   [] transfers   [] heat/ice application    [] other:      Other Objective/Functional Measures:    Cont to have increased soft tissue tension noted at L psoas     Post Treatment Pain Level (on 0 to 10) scale:   0  / 10     ASSESSMENT  Assessment/Changes in Function:     Cont to c/o fatigue with therex     []  See Progress Note/Recertification   Patient will continue to benefit from skilled PT services to modify and progress therapeutic interventions, address functional mobility deficits and address ROM deficits to attain remaining goals.    Progress toward goals / Updated goals:    Slowly progressing with pain reduction      PLAN  [x]  Upgrade activities as tolerated yes Continue plan of care   []  Discharge due to :    []  Other:      Therapist: Vamsi Montana PT    Date: 10/16/2020 Time: 6:49 AM     Future Appointments   Date Time Provider Basil Trivedi   10/16/2020  9:15 AM Taz Chavis, PT [facility] [address]   10/20/2020 12:15 PM Taz Chavis PT [facility] [address]   11/6/2020  7:45 AM Taz Chavis PT [address]   11/9/2020  8:30 AM Taz Chavis PT [address]   11/13/2020 10:00 AM Taz Chavis PT [address]   11/17/2020 10:00 [facility] [address]   11/20/2020  8:30 AM [facility] [address]   11/23/2020  8:30 AM Taz Chavis, PT [address]

## 2020-10-20 ENCOUNTER — APPOINTMENT (OUTPATIENT)
Dept: PHYSICAL THERAPY | Age: 40
End: 2020-10-20
Payer: MEDICAID

## 2020-11-06 ENCOUNTER — HOSPITAL ENCOUNTER (OUTPATIENT)
Dept: PHYSICAL THERAPY | Age: 40
End: 2020-11-06
Payer: MEDICAID

## 2020-11-09 ENCOUNTER — HOSPITAL ENCOUNTER (OUTPATIENT)
Dept: PHYSICAL THERAPY | Age: 40
Discharge: HOME | End: 2020-11-09
Payer: MEDICAID

## 2020-11-09 PROCEDURE — 97112 NEUROMUSCULAR REEDUCATION: CPT

## 2020-11-09 PROCEDURE — 97110 THERAPEUTIC EXERCISES: CPT

## 2020-11-09 NOTE — PROGRESS NOTES
PHYSICAL THERAPY - DAILY TREATMENT NOTE    Patient Name: Jamial Bucio        Date: 2020  : 1980   yes Patient  Verified  Visit #:      12  Insurance: Payor: BLUE CROSS MEDICAID / Plan: [facility] HEALTHKEEPERS PLUS / Product Type: Managed Care Medicaid /      In time: 8:30 Out time: 9:00   Total Treatment Time: 30     Medicare/BCBS Time Tracking (below)   Total Timed Codes (min):  30 1:1 Treatment Time:  30     TREATMENT AREA =  Low back pain [M54.5]  SUBJECTIVE  Pain Level (on 0 to 10 scale):  0  / 10   Medication Changes/New allergies or changes in medical history, any new surgeries or procedures?    no  If yes, update Summary List   Subjective Functional Status/Changes:  []  No changes reported     Overall Im doing great. 3/10 at the worst with prolonged walking. OBJECTIVE  15 min Therapeutic Exercise:  [x]? ???  See flow sheet   Rationale:      increase ROM, increase strength and improve coordination to improve the patients ability to perform ADLs      10 min Neuromuscular Re-ed: [x]????  See flow sheet   Rationale:    improve coordination to improve the patients ability to perform ADLs        5 min Manual Therapy: DTM B Gm ,L4 L FRS akua   Rationale:      decrease pain, increase ROM and increase tissue extensibility to improve patient's ability to perform ADLs     The manual therapy interventions were performed at a separate and distinct time from the therapeutic activities interventions.       Billed With/As:   [] TE   [] TA   [] Neuro   [] Self Care Patient Education: [x] Review HEP    [] Progressed/Changed HEP based on:   [] positioning   [] body mechanics   [] transfers   [] heat/ice application    [] other:      Other Objective/Functional Measures:    FOTO = 67  GROC = +5     Post Treatment Pain Level (on 0 to 10) scale:   0  / 10     ASSESSMENT  Assessment/Changes in Function:     Cont to have difficulty maintaining neutral spine with QP LE     []  See Progress Note/Recertification   Patient will continue to benefit from skilled PT services to modify and progress therapeutic interventions, address functional mobility deficits and address ROM deficits to attain remaining goals.    Progress toward goals / Updated goals:    Progressing well with symptom reduction      PLAN  [x]  Upgrade activities as tolerated yes Continue plan of care   []  Discharge due to :    []  Other:      Therapist: Ochoa Burns PT    Date: 11/9/2020 Time: 9:44 AM     Future Appointments   Date Time Provider Basil Trivedi   11/17/2020 10:00 AM Terri Ashby, PT [facility]   11/23/2020  8:30 AM Terri Ashby, PT [address]   12/11/2020  2:15 PM Terri Ashby, PT [facility]   12/15/2020 10:45 AM Terri Ashby, PT [address]

## 2020-11-09 NOTE — PROGRESS NOTES
[facility] THERAPY   [address], [facility] [address],[facility], [facility] - Phone: [phone]  Fax: [phone]  PROGRESS NOTE  Patient Name: Mao Salgado : 1980   Treatment/Medical Diagnosis: Low back pain [M54.5]   Referral Source: Malik Dumont MD     Date of Initial Visit: 10/01/20 Attended Visits: 7 Missed Visits: 0     SUMMARY OF TREATMENT  Mao Salgado has been seen at our clinic  for a total of 7 visits. Pt treatment has consisted of  therapeutic exercise for thoracic ROM, hip/core strengthening, and manual therapy(jt mobilization and deep tissue mobilization)  CURRENT STATUS  Pt has had a good tolerance to physical therapy treatment. She reports significant decrease in her pain level and improved function with ADLs. She continues to demonstrate instability at her lower lumbar region and complains of difficulty and increase in pain with prolonged walking. Goal/Measure of Progress Goal Met? 1. Decrease Max pain by 50-75% to assist with ADLs   Status at last Eval: 5/10 Current Status: 3/10 progressing   2. Increase FOTO score to 66 % show functional improvement   Status at last Eval: 61% Current Status: 67% yes   3. Will rate >/= +5 on Global Rating of Change and be prepared to DC to HEP. Status at last Eval: na Current Status: +5 yes     New Goals to be achieved in __4__  weeks:  1. Cont with goal #1 above   2.     3.     RECOMMENDATIONS    Specifics: 2-3x/wk x 4 more wks  If you have any questions/comments please contact us directly at [phone]. Thank you for allowing us to assist in the care of your patient.     Therapist Signature: Sundar Quiroga, WILLIE, OCS, SCS, CSCS Date: 2020     Time: 9:50 AM   NOTE TO PHYSICIAN:  PLEASE COMPLETE THE ORDERS BELOW AND FAX TO   [facility] [place] Physical Therapy: [phone]  If you are unable to process this request in 24 hours please contact our office: [phone]    ___ I have read the above report and request that my patient continue as recommended.   ___ I have read the above report and request that my patient continue therapy with the following changes/special instructions:_________________________________________________________   ___ I have read the above report and request that my patient be discharged from therapy.      Physician Signature:        Date:       Time:

## 2020-11-13 ENCOUNTER — APPOINTMENT (OUTPATIENT)
Dept: PHYSICAL THERAPY | Age: 40
End: 2020-11-13
Payer: MEDICAID

## 2020-11-20 ENCOUNTER — APPOINTMENT (OUTPATIENT)
Dept: PHYSICAL THERAPY | Age: 40
End: 2020-11-20
Payer: MEDICAID

## 2020-11-23 ENCOUNTER — HOSPITAL ENCOUNTER (OUTPATIENT)
Dept: PHYSICAL THERAPY | Age: 40
Discharge: HOME | End: 2020-11-23
Payer: MEDICAID

## 2020-11-23 PROCEDURE — 97112 NEUROMUSCULAR REEDUCATION: CPT

## 2020-11-23 PROCEDURE — 97116 GAIT TRAINING THERAPY: CPT

## 2020-11-23 NOTE — PROGRESS NOTES
PHYSICAL THERAPY - DAILY TREATMENT NOTE    Patient Name: Gordon Service        Date: 2020  : 1980   yes Patient  Verified  Visit #:      12  Insurance: Payor: BLUE CROSS MEDICAID / Plan: [facility] HEALTHKEEPERS PLUS / Product Type: Managed Care Medicaid /      In time: 8:35 Out time: 9:05   Total Treatment Time: 30     Medicare/[facility] Time Tracking (below)   Total Timed Codes (min):  30 1:1 Treatment Time:  30     TREATMENT AREA =  Low back pain [M54.5]  SUBJECTIVE  Pain Level (on 0 to 10 scale):  0  / 10   Medication Changes/New allergies or changes in medical history, any new surgeries or procedures?    no  If yes, update Summary List   Subjective Functional Status/Changes:  []  No changes reported     Its been pretty good. I have been doing a lot of yard work, and its been sore, but no real pain. OBJECTIVE  15 min Therapeutic Exercise:  [x]? ????  See flow sheet   Rationale:      increase ROM, increase strength and improve coordination to improve the patients ability to perform ADLs      10 min Neuromuscular Re-ed: [x]?????  See flow sheet   Rationale:    improve coordination to improve the patients ability to perform ADLs        5 min Manual Therapy: DTM B Gm , L RF, L on L SI akua   Rationale:      decrease pain, increase ROM and increase tissue extensibility to improve patient's ability to perform ADLs     The manual therapy interventions were performed at a separate and distinct time from the therapeutic activities interventions.       Billed With/As:   [] TE   [] TA   [] Neuro   [] Self Care Patient Education: [x] Review HEP    [] Progressed/Changed HEP based on:   [] positioning   [] body mechanics   [] transfers   [] heat/ice application    [] other:      Other Objective/Functional Measures:    Cont to have increased soft tissue tension noted at L RF     Post Treatment Pain Level (on 0 to 10) scale:   0  / 10     ASSESSMENT  Assessment/Changes in Function:     Able to perform DNS star for 10 reps without taking a break. []  See Progress Note/Recertification   Patient will continue to benefit from skilled PT services to modify and progress therapeutic interventions, address functional mobility deficits and address ROM deficits to attain remaining goals.    Progress toward goals / Updated goals:    Progressing well with pain reduction      PLAN  [x]  Upgrade activities as tolerated yes Continue plan of care   []  Discharge due to :    []  Other:      Therapist: Tutu Heard, PT    Date: 11/23/2020 Time: 6:53 AM     Future Appointments   Date Time Provider Basil Trivedi   11/23/2020  8:30 AM Graeme Ahuja, PT [address]   12/11/2020  2:15 PM Graeme Ahuja, PT RADHA MARTINEZ [facility]   12/15/2020 10:45 AM Graeme Ahuja, PATRICIA [address]

## 2020-12-11 ENCOUNTER — HOSPITAL ENCOUNTER (OUTPATIENT)
Dept: PHYSICAL THERAPY | Age: 40
Discharge: HOME | End: 2020-12-11
Payer: MEDICAID

## 2020-12-11 PROCEDURE — 97110 THERAPEUTIC EXERCISES: CPT

## 2020-12-11 PROCEDURE — 97112 NEUROMUSCULAR REEDUCATION: CPT

## 2020-12-11 NOTE — PROGRESS NOTES
PHYSICAL THERAPY - DAILY TREATMENT NOTE    Patient Name: Hazel Reyes        Date: 2020  : 1980   yes Patient  Verified  Visit #:     Insurance: Payor: BLUE CROSS MEDICAID / Plan: [facility] [place] [facility]KEEPERS PLUS / Product Type: Managed Care Medicaid /      In time: 2:15 Out time: 2:45   Total Treatment Time: 30     Medicare/[employer] Time Tracking (below)   Total Timed Codes (min):  30 1:1 Treatment Time:  30     TREATMENT AREA =  Low back pain [M54.5]  SUBJECTIVE  Pain Level (on 0 to 10 scale):  2  / 10   Medication Changes/New allergies or changes in medical history, any new surgeries or procedures?    no  If yes, update Summary List   Subjective Functional Status/Changes:  []  No changes reported     Just very sore at the side of my hips (L>R)          OBJECTIVE  15 min Therapeutic Exercise:  [x]??????  See flow sheet   Rationale:      increase ROM, increase strength and improve coordination to improve the patients ability to perform ADLs      10 min Neuromuscular Re-ed: [x]??????  See flow sheet   Rationale:    improve coordination to improve the patients ability to perform ADLs        5 min Manual Therapy: DTM B Deep ER of hips , L RF, L on L SI akua,    Rationale:      decrease pain, increase ROM and increase tissue extensibility to improve patient's ability to perform ADLs     The manual therapy interventions were performed at a separate and distinct time from the therapeutic activities interventions.         Billed With/As:   [] TE   [] TA   [] Neuro   [] Self Care Patient Education: [x] Review HEP    [] Progressed/Changed HEP based on:   [] positioning   [] body mechanics   [] transfers   [] heat/ice application    [] other:      Other Objective/Functional Measures:    Cont to to have sig increase in soft tissue tension  Noted at B Deep ER of hips     Post Treatment Pain Level (on 0 to 10) scale:   0  / 10     ASSESSMENT  Assessment/Changes in Function:     Decreased pain with sit to stand after man Rx     []  See Progress Note/Recertification   Patient will continue to benefit from skilled PT services to modify and progress therapeutic interventions, address functional mobility deficits and address ROM deficits to attain remaining goals.    Progress toward goals / Updated goals:    Slow progress with pain reduction      PLAN  [x]  Upgrade activities as tolerated yes Continue plan of care   []  Discharge due to :    []  Other:      Therapist: Nanci Camacho PT    Date: 12/11/2020 Time: 6:48 AM     Future Appointments   Date Time Provider Basil Trivedi   12/11/2020  2:15 PM Sreekanth Viramontes, PATRICIA [place] JUAN [facility]   12/15/2020 10:45 AM Sreekanth Viramontes, PATRICIA [id number]

## 2020-12-15 ENCOUNTER — APPOINTMENT (OUTPATIENT)
Dept: PHYSICAL THERAPY | Age: 40
End: 2020-12-15
Payer: MEDICAID

## 2021-01-05 ENCOUNTER — HOSPITAL ENCOUNTER (OUTPATIENT)
Dept: PHYSICAL THERAPY | Age: 41
Discharge: HOME | End: 2021-01-05
Payer: MEDICAID

## 2021-01-05 PROCEDURE — 97110 THERAPEUTIC EXERCISES: CPT

## 2021-01-05 PROCEDURE — 97112 NEUROMUSCULAR REEDUCATION: CPT

## 2021-01-05 NOTE — PROGRESS NOTES
PHYSICAL THERAPY - DAILY TREATMENT NOTE    Patient Name: Juliana An        Date: 2021  : 1980   yes Patient  Verified  Visit #:   10   of   24  Insurance: Payor: BLUE CROSS MEDICAID / Plan: [facility] [employer] [place] [facility]KEEPERS PL[facility] / Product Type: Managed Care Medicaid /      In time: 10:00 Out time: 10:35   Total Treatment Time: 35     Medicare/[employer] Time Tracking (below)   Total Timed Codes (min):  35 1:1 Treatment Time:  35     TREATMENT AREA =  Low back pain [M54.5]  SUBJECTIVE  Pain Level (on 0 to 10 scale):  0  / 10   Medication Changes/New allergies or changes in medical history, any new surgeries or procedures?    no  If yes, update Summary List   Subjective Functional Status/Changes:  []  No changes reported     I was able to make 16 hr drive without increase in pain in my back, but My R hip has been hurting more. 2/10 at the worst in my back. OBJECTIVE  15 min Therapeutic Exercise:  [x]???????  See flow sheet   Rationale:      increase ROM, increase strength and improve coordination to improve the patients ability to perform ADLs      10 min Neuromuscular Re-ed: [x]???????  See flow sheet   Rationale:    improve coordination to improve the patients ability to perform ADLs        5 min Manual Therapy: DTM B Deep ER of hips , L RF, L on L SI akua,    Rationale:      decrease pain, increase ROM and increase tissue extensibility to improve patient's ability to perform ADLs     The manual therapy interventions were performed at a separate and distinct time from the therapeutic activities interventions.       Billed With/As:   [] TE   [] TA   [] Neuro   [] Self Care Patient Education: [x] Review HEP    [] Progressed/Changed HEP based on:   [] positioning   [] body mechanics   [] transfers   [] heat/ice application    [] other:      Other Objective/Functional Measures:    FOTO = 67  GROC = +4     Post Treatment Pain Level (on 0 to 10) scale:   0  / 10     ASSESSMENT  Assessment/Changes in Function:     Required VCs for KB deadlift     []  See Progress Note/Recertification   Patient will continue to benefit from skilled PT services to modify and progress therapeutic interventions, address functional mobility deficits and address ROM deficits to attain remaining goals. Progress toward goals / Updated goals:    Slowly progressing with pain reduction      PLAN  [x]  Upgrade activities as tolerated yes Continue plan of care   []  Discharge due to :    []  Other:      Therapist: Joey Lin, PT    Date: 1/5/2021 Time: 1:56 PM     No future appointments.

## 2021-01-07 NOTE — PROGRESS NOTES
[facility] THERAPY   [facility] [address],[facility], [address] [facility] [address] - Phone: [phone]  Fax: [phone]  PROGRESS NOTE  Patient Name: Juliana An : 1980   Treatment/Medical Diagnosis: Low back pain [M54.5]   Referral Source: Shayna Anand MD       Date of Initial Visit: 10/01/20 Attended Visits: 10 Missed Visits: 0      SUMMARY OF TREATMENT  Juliana An has been seen at our clinic  for a total of 10 visits. Pt treatment has consisted of  therapeutic exercise for thoracic ROM, hip/core strengthening, and manual therapy(jt mobilization and deep tissue mobilization)    CURRENT STATUS  Pt has had a good tolerance to physical therapy treatment. She has only attended PT 3 visits in 8 wks, however reports significant decrease in her pain level and improved function with ADLs. She reports she was able to drive for 16 hrs without increase in pain. She continues to demonstrate instability at anterior core and presents with hyperlordotic posture.              Goal/Measure of Progress Goal Met? 1. Decrease Max pain by 50-75% to assist with ADLs   Status at last Eval: 5/10 Current Status: 2/10 yes     New Goals to be achieved in __4__  weeks:  1. Pt will be independent with advanced HEP in prep for DC   2.     3.     RECOMMENDATIONS    Specifics: 1-2/wk x 4 more wks  If you have any questions/comments please contact us directly at [phone]. Thank you for allowing us to assist in the care of your patient.     Therapist Signature: Michelle Solorzano, DPT, OCS, SCS, CSCS Date: 2021     Time: 7:33 AM   NOTE TO PHYSICIAN:  PLEASE COMPLETE THE ORDERS BELOW AND FAX TO   [facility]: [phone]  If you are unable to process this request in 24 hours please contact our office: [phone]    ___ I have read the above report and request that my patient continue as recommended.   ___ I have read the above report and request that my patient continue therapy with the following changes/special instructions:_________________________________________________________   ___ I have read the above report and request that my patient be discharged from therapy.      Physician Signature:        Date:       Time:

## 2021-01-28 ENCOUNTER — HOSPITAL ENCOUNTER (OUTPATIENT)
Dept: PHYSICAL THERAPY | Age: 41
Discharge: HOME | End: 2021-01-28
Payer: MEDICAID

## 2021-01-28 PROCEDURE — 97112 NEUROMUSCULAR REEDUCATION: CPT

## 2021-01-28 PROCEDURE — 97140 MANUAL THERAPY 1/> REGIONS: CPT

## 2021-01-28 PROCEDURE — 97110 THERAPEUTIC EXERCISES: CPT

## 2021-01-28 NOTE — PROGRESS NOTES
PHYSICAL THERAPY - DAILY TREATMENT NOTE    Patient Name: Tati Willard        Date: 2021  : 1980   yes Patient  Verified  Visit #:     Insurance: Payor: BLUE CROSS MEDICAID / Plan: [facility] HEALTHKEEPERS PLUS / Product Type: Managed Care Medicaid /      In time: 1:30 Out time: 2:10   Total Treatment Time: 40     Medicare/[employer]BS Time Tracking (below)   Total Timed Codes (min):  40 1:1 Treatment Time:  40     TREATMENT AREA =  Low back pain [M54.5]  SUBJECTIVE  Pain Level (on 0 to 10 scale):  1-2   10   Medication Changes/New allergies or changes in medical history, any new surgeries or procedures?    no  If yes, update Summary List   Subjective Functional Status/Changes:  []  No changes reported     I started to work out with a  who is also an PT. It didn't hurt while I was working out, but it kind of hurts now. OBJECTIVE  15 min Therapeutic Exercise:  [x]????????  See flow sheet   Rationale:      increase ROM, increase strength and improve coordination to improve the patients ability to perform ADLs      10 min Neuromuscular Re-ed: [x]????????  See flow sheet   Rationale:    improve coordination to improve the patients ability to perform ADLs        15 min Manual Therapy: DTM B Deep ER of hips , L RF, psoas, L on L SI akua, t/s mob   Rationale:      decrease pain, increase ROM and increase tissue extensibility to improve patient's ability to perform ADLs     The manual therapy interventions were performed at a separate and distinct time from the therapeutic activities interventions.     Billed With/As:   [] TE   [] TA   [] Neuro   [] Self Care Patient Education: [x] Review HEP    [] Progressed/Changed HEP based on:   [] positioning   [] body mechanics   [] transfers   [] heat/ice application    [] other:      Other Objective/Functional Measures:    Cont to have B ant IS inn with lack of fwd hip excursion with muti segmental ext     Post Treatment Pain Level (on 0 to 10) scale:   0  / 10     ASSESSMENT  Assessment/Changes in Function:     Dakota to engage glute max with assistance from band with bridge     []  See Progress Note/Recertification   Patient will continue to benefit from skilled PT services to modify and progress therapeutic interventions, address functional mobility deficits, address ROM deficits and address strength deficits to attain remaining goals.    Progress toward goals / Updated goals:    Slow progresswith function     PLAN  [x]  Upgrade activities as tolerated yes Continue plan of care   []  Discharge due to :    []  Other:      Therapist: Jeri Hanna, PT    Date: 1/28/2021 Time: 1:13 PM     Future Appointments   Date Time Provider Basil Trivedi   1/28/2021  1:30 PM Rox Ingram PT RADHA MARTINEZ [address]   2/8/2021 11:30 AM PATRICIA Anaya [address]

## 2021-02-08 ENCOUNTER — HOSPITAL ENCOUNTER (OUTPATIENT)
Dept: PHYSICAL THERAPY | Age: 41
Discharge: HOME | End: 2021-02-08
Payer: MEDICAID

## 2021-02-08 PROCEDURE — 97110 THERAPEUTIC EXERCISES: CPT

## 2021-02-08 PROCEDURE — 97140 MANUAL THERAPY 1/> REGIONS: CPT

## 2021-02-08 NOTE — PROGRESS NOTES
PHYSICAL THERAPY - DAILY TREATMENT NOTE    Patient Name: Ana Rangel        Date: 2021  : 1980   yes Patient  Verified  Visit #:     Insurance: Payor: BLUE CROSS MEDICAID / Plan: [facility] [place] HEALTHKEEPERS PLUS / Product Type: Managed Care Medicaid /      In time: 11:30 Out time: 12:05   Total Treatment Time: 35     Medicare/BCBS Time Tracking (below)   Total Timed Codes (min):  35 1:1 Treatment Time:  35     TREATMENT AREA =  Low back pain [M54.5]  SUBJECTIVE  Pain Level (on 0 to 10 scale):  0  / 10   Medication Changes/New allergies or changes in medical history, any new surgeries or procedures?    no  If yes, update Summary List   Subjective Functional Status/Changes:  []  No changes reported     I have been focusing on my glutes and its been great. OBJECTIVE  15 min Therapeutic Exercise:  [x]?????????  See flow sheet   Rationale:      increase ROM, increase strength and improve coordination to improve the patients ability to perform ADLs      - min Neuromuscular Re-ed: [x]?????????  See flow sheet   Rationale:    improve coordination to improve the patients ability to perform ADLs        20 min Manual Therapy: DTM B Deep ER of hips , L RF, psoas, L on L SI akua, t/s mob   Rationale:      decrease pain, increase ROM and increase tissue extensibility to improve patient's ability to perform ADLs     The manual therapy interventions were performed at a separate and distinct time from the therapeutic activities interventions.       Billed With/As:   [] TE   [] TA   [] Neuro   [] Self Care Patient Education: [x] Review HEP    [] Progressed/Changed HEP based on:   [] positioning   [] body mechanics   [] transfers   [] heat/ice application    [] other:      Other Objective/Functional Measures:    Cont to to move excessively from spine and lack of rot from hips with multisegmental Rot     Post Treatment Pain Level (on 0 to 10) scale:   0  / 10 ASSESSMENT  Assessment/Changes in Function:     Improved hip rot with multisegmental rot after today's Rx     []  See Progress Note/Recertification   Patient will continue to benefit from skilled PT services to modify and progress therapeutic interventions, address functional mobility deficits and address ROM deficits to attain remaining goals.    Progress toward goals / Updated goals:    Progressing wel with pain reduciton      PLAN  [x]  Upgrade activities as tolerated yes Continue plan of care   []  Discharge due to :    []  Other:      Therapist: Gisele Zavala, PT    Date: 2/8/2021 Time: 6:56 AM     Future Appointments   Date Time Provider Basil Trivedi   2/8/2021 11:30 AM Zi Mendosa, PT [address]

## 2021-03-08 ENCOUNTER — HOSPITAL ENCOUNTER (OUTPATIENT)
Dept: PHYSICAL THERAPY | Age: 41
Discharge: HOME | End: 2021-03-08
Payer: MEDICAID

## 2021-03-08 PROCEDURE — 97112 NEUROMUSCULAR REEDUCATION: CPT

## 2021-03-08 PROCEDURE — 97110 THERAPEUTIC EXERCISES: CPT

## 2021-03-08 NOTE — PROGRESS NOTES
PHYSICAL THERAPY - DAILY TREATMENT NOTE    Patient Name: Sherman Thomas        Date: 3/8/2021  : 1980   yes Patient  Verified  Visit #:   15   of   24  Insurance: Payor: BLUE CROSS MEDICAID / Plan: [facility] HEALTHKEEPERS PLUS / Product Type: Managed Care Medicaid /      In time: 10:00 Out time: 10:30   Total Treatment Time: 30     Medicare/[employer]BS Time Tracking (below)   Total Timed Codes (min):  30 1:1 Treatment Time:  30     TREATMENT AREA =  Low back pain [M54.5]  SUBJECTIVE  Pain Level (on 0 to 10 scale):  0  / 10   Medication Changes/New allergies or changes in medical history, any new surgeries or procedures?    no  If yes, update Summary List   Subjective Functional Status/Changes:  []  No changes reported     Danny Ulloa been doing pretty good. I drove and back from [place] and lifted bunch of boxes over the weekend.   2/10 at the worst.           OBJECTIVE  20 min Therapeutic Exercise:  [x]??????????  See flow sheet   Rationale:      increase ROM, increase strength and improve coordination to improve the patients ability to perform ADLs      10 min Neuromuscular Re-ed: [x]??????????  See flow sheet   Rationale:    improve coordination to improve the patients ability to perform ADLs          Billed With/As:   [] TE   [] TA   [] Neuro   [] Self Care Patient Education: [x] Review HEP    [] Progressed/Changed HEP based on:   [] positioning   [] body mechanics   [] transfers   [] heat/ice application    [] other:      Other Objective/Functional Measures:    FOTO = 65  GROC = +5     Post Treatment Pain Level (on 0 to 10) scale:   0  / 10     ASSESSMENT  Assessment/Changes in Function:     See DC     []  See Progress Note/Recertification      Progress toward goals / Updated goals:    See DC     PLAN  [x]  Upgrade activities as tolerated yes Continue plan of care   []  Discharge due to :    []  Other:      Therapist: Leonardo Phillips PT    Date: 3/8/2021 Time: 10:28 AM     Future Appointments   Date Time Provider Basil Trivedi   3/17/2021  8:30 AM Leopoldo Perch, PT [facility]   3/22/2021 10:45 AM Leopoldo Perch, PT [facility]   3/29/2021 10:45 AM Leopoldo Perch, PT [address]

## 2021-03-10 NOTE — PROGRESS NOTES
[facility] PHYSICAL THERAPY  [address] [place] [address] [address],Karlie Mcmahon, [facility] - Phone: [phone]  Fax: [phone]  DISCHARGE SUMMARY  Patient Name: Milan Snider : 1980   Treatment/Medical Diagnosis: Low back pain [M54.5]   Referral Source: Kay Barber MD       Date of Initial Visit: 10/01/20 Attended Visits: 13 Missed Visits: 0      SUMMARY OF TREATMENT  Rox Isabel been seen at our clinic  for a total of 13 visits.  Pt treatment has consisted of  therapeutic exercise for thoracic ROM, hip/core strengthening, and manual therapy(jt mobilization and deep tissue mobilization)     CURRENT STATUS  Pt has had a good tolerance to physical therapy treatment.  She now reports minimal pain with driving down to [place] and back as well as lifting multiple boxes. She also demonstrate sufficient movement pattern in all planes. We feel the she will be able to continue to manage her  Symptom independenlty at this point.                    Goal/Measure of Progress Goal Met? 1.  Pt will be independent with advanced HEP in prep for DCADLs   Status at last Eval: na Current Status: Independent with all ex yes        RECOMMENDATIONS  Discharge from physical therapy treatment with HEP. Specifics:   If you have any questions/comments please contact us directly at [phone]. Thank you for allowing us to assist in the care of your patient.     Therapist Signature: Lady Membreno, DPTYRELL, OCS, SCS, CSCS Date: 3/10/2021     Time: 9:46 AM

## 2021-03-17 ENCOUNTER — APPOINTMENT (OUTPATIENT)
Dept: PHYSICAL THERAPY | Age: 41
End: 2021-03-17
Payer: MEDICAID

## 2021-03-22 ENCOUNTER — APPOINTMENT (OUTPATIENT)
Dept: PHYSICAL THERAPY | Age: 41
End: 2021-03-22
Payer: MEDICAID

## 2021-03-29 ENCOUNTER — APPOINTMENT (OUTPATIENT)
Dept: PHYSICAL THERAPY | Age: 41
End: 2021-03-29
Payer: MEDICAID